# Patient Record
Sex: FEMALE | Race: WHITE | NOT HISPANIC OR LATINO | Employment: OTHER | URBAN - METROPOLITAN AREA
[De-identification: names, ages, dates, MRNs, and addresses within clinical notes are randomized per-mention and may not be internally consistent; named-entity substitution may affect disease eponyms.]

---

## 2019-02-17 ENCOUNTER — OFFICE VISIT (OUTPATIENT)
Dept: URGENT CARE | Facility: CLINIC | Age: 64
End: 2019-02-17
Payer: COMMERCIAL

## 2019-02-17 VITALS
OXYGEN SATURATION: 96 % | RESPIRATION RATE: 14 BRPM | SYSTOLIC BLOOD PRESSURE: 140 MMHG | DIASTOLIC BLOOD PRESSURE: 90 MMHG | HEART RATE: 62 BPM | TEMPERATURE: 98 F

## 2019-02-17 DIAGNOSIS — J01.00 ACUTE NON-RECURRENT MAXILLARY SINUSITIS: ICD-10-CM

## 2019-02-17 DIAGNOSIS — R05.9 COUGH: Primary | ICD-10-CM

## 2019-02-17 LAB
CTP QC/QA: YES
FLUAV AG NPH QL: NEGATIVE
FLUBV AG NPH QL: NEGATIVE

## 2019-02-17 PROCEDURE — 87804 POCT INFLUENZA A/B: ICD-10-PCS | Mod: QW,S$GLB,, | Performed by: FAMILY MEDICINE

## 2019-02-17 PROCEDURE — 3077F SYST BP >= 140 MM HG: CPT | Mod: CPTII,S$GLB,, | Performed by: FAMILY MEDICINE

## 2019-02-17 PROCEDURE — 99214 PR OFFICE/OUTPT VISIT, EST, LEVL IV, 30-39 MIN: ICD-10-PCS | Mod: 25,S$GLB,, | Performed by: FAMILY MEDICINE

## 2019-02-17 PROCEDURE — 96372 PR INJECTION,THERAP/PROPH/DIAG2ST, IM OR SUBCUT: ICD-10-PCS | Mod: S$GLB,,, | Performed by: FAMILY MEDICINE

## 2019-02-17 PROCEDURE — 96372 THER/PROPH/DIAG INJ SC/IM: CPT | Mod: S$GLB,,, | Performed by: FAMILY MEDICINE

## 2019-02-17 PROCEDURE — 3080F DIAST BP >= 90 MM HG: CPT | Mod: CPTII,S$GLB,, | Performed by: FAMILY MEDICINE

## 2019-02-17 PROCEDURE — 87804 INFLUENZA ASSAY W/OPTIC: CPT | Mod: QW,S$GLB,, | Performed by: FAMILY MEDICINE

## 2019-02-17 PROCEDURE — 99214 OFFICE O/P EST MOD 30 MIN: CPT | Mod: 25,S$GLB,, | Performed by: FAMILY MEDICINE

## 2019-02-17 PROCEDURE — 3077F PR MOST RECENT SYSTOLIC BLOOD PRESSURE >= 140 MM HG: ICD-10-PCS | Mod: CPTII,S$GLB,, | Performed by: FAMILY MEDICINE

## 2019-02-17 PROCEDURE — 3080F PR MOST RECENT DIASTOLIC BLOOD PRESSURE >= 90 MM HG: ICD-10-PCS | Mod: CPTII,S$GLB,, | Performed by: FAMILY MEDICINE

## 2019-02-17 RX ORDER — BETAMETHASONE SODIUM PHOSPHATE AND BETAMETHASONE ACETATE 3; 3 MG/ML; MG/ML
6 INJECTION, SUSPENSION INTRA-ARTICULAR; INTRALESIONAL; INTRAMUSCULAR; SOFT TISSUE
Status: COMPLETED | OUTPATIENT
Start: 2019-02-17 | End: 2019-02-17

## 2019-02-17 RX ORDER — AMOXICILLIN 875 MG/1
875 TABLET, FILM COATED ORAL 2 TIMES DAILY
Qty: 20 TABLET | Refills: 0 | Status: SHIPPED | OUTPATIENT
Start: 2019-02-17 | End: 2019-02-27

## 2019-02-17 RX ORDER — CODEINE PHOSPHATE AND GUAIFENESIN 10; 100 MG/5ML; MG/5ML
10 SOLUTION ORAL EVERY 6 HOURS PRN
Qty: 118 ML | Refills: 0 | Status: SHIPPED | OUTPATIENT
Start: 2019-02-17 | End: 2020-03-10

## 2019-02-17 RX ADMIN — BETAMETHASONE SODIUM PHOSPHATE AND BETAMETHASONE ACETATE 6 MG: 3; 3 INJECTION, SUSPENSION INTRA-ARTICULAR; INTRALESIONAL; INTRAMUSCULAR; SOFT TISSUE at 01:02

## 2019-02-17 NOTE — PROGRESS NOTES
Subjective:       Patient ID: Leila Stafford is a 63 y.o. female.    Vitals:  oral temperature is 97.7 °F (36.5 °C). Her blood pressure is 140/90 (abnormal) and her pulse is 62. Her respiration is 14 and oxygen saturation is 96%.     Chief Complaint: Cough    Pt c/o productive cough, started last night, nasal congestion, post nasal drip, headaches, bilateral ear pain, sore throat, taking Advil no relief       Cough   This is a new problem. The current episode started yesterday. The problem has been unchanged. The cough is productive of sputum. Associated symptoms include ear pain, headaches, nasal congestion, postnasal drip and a sore throat. Pertinent negatives include no chills, eye redness, fever, hemoptysis, myalgias, rash, shortness of breath or wheezing. Treatments tried: advil. The treatment provided no relief.       Constitution: Positive for fatigue. Negative for chills, sweating and fever.   HENT: Positive for ear pain, congestion, postnasal drip and sore throat. Negative for sinus pain, sinus pressure and voice change.    Neck: Negative for painful lymph nodes.   Eyes: Negative for eye redness.   Respiratory: Positive for cough and sputum production. Negative for chest tightness, bloody sputum, COPD, shortness of breath, stridor, wheezing and asthma.    Gastrointestinal: Negative for nausea and vomiting.   Musculoskeletal: Negative for muscle ache.   Skin: Negative for rash.   Allergic/Immunologic: Negative for seasonal allergies and asthma.   Neurological: Positive for headaches.   Hematologic/Lymphatic: Negative for swollen lymph nodes.       Objective:      Physical Exam   Constitutional: She is oriented to person, place, and time. She appears well-developed and well-nourished. She is cooperative.  Non-toxic appearance. She does not appear ill. No distress.   HENT:   Head: Normocephalic and atraumatic.   Right Ear: Hearing, tympanic membrane, external ear and ear canal normal.   Left Ear: Hearing,  tympanic membrane, external ear and ear canal normal.   Nose: Nose normal. No mucosal edema, rhinorrhea or nasal deformity. No epistaxis. Right sinus exhibits no maxillary sinus tenderness and no frontal sinus tenderness. Left sinus exhibits no maxillary sinus tenderness and no frontal sinus tenderness.   Mouth/Throat: Uvula is midline, oropharynx is clear and moist and mucous membranes are normal. No trismus in the jaw. Normal dentition. No uvula swelling. No posterior oropharyngeal erythema.   Eyes: Conjunctivae and lids are normal. No scleral icterus.   Sclera clear bilat   Neck: Trachea normal, full passive range of motion without pain and phonation normal. Neck supple.   Cardiovascular: Normal rate, regular rhythm, normal heart sounds, intact distal pulses and normal pulses.   Pulmonary/Chest: Effort normal and breath sounds normal. No respiratory distress.   Abdominal: Soft. Normal appearance and bowel sounds are normal. She exhibits no distension. There is no tenderness.   Musculoskeletal: Normal range of motion. She exhibits no edema or deformity.   Neurological: She is alert and oriented to person, place, and time. She exhibits normal muscle tone. Coordination normal.   Skin: Skin is warm, dry and intact. She is not diaphoretic. No pallor.   Psychiatric: She has a normal mood and affect. Her speech is normal and behavior is normal. Judgment and thought content normal. Cognition and memory are normal.   Nursing note and vitals reviewed.      Assessment:       1. Cough    2. Acute non-recurrent maxillary sinusitis        Plan:         Cough  -     POCT Influenza A/B    Acute non-recurrent maxillary sinusitis    Other orders  -     betamethasone acetate-betamethasone sodium phosphate injection 6 mg  -     amoxicillin (AMOXIL) 875 MG tablet; Take 1 tablet (875 mg total) by mouth 2 (two) times daily. for 10 days  Dispense: 20 tablet; Refill: 0  -     guaifenesin-codeine 100-10 mg/5 ml (TUSSI-ORGANIDIN NR)   mg/5 mL syrup; Take 10 mLs by mouth every 6 (six) hours as needed for Cough.  Dispense: 118 mL; Refill: 0     Patient's  has bacterial sinusitis.  Her symptoms of or of more recent onset for recommended she start with the cough syrup steroid injection if symptoms improve she probably does not need the Amoxil  .  Influenza titers negative.

## 2019-02-20 ENCOUNTER — TELEPHONE (OUTPATIENT)
Dept: URGENT CARE | Facility: CLINIC | Age: 64
End: 2019-02-20

## 2020-05-25 ENCOUNTER — CLINICAL SUPPORT (OUTPATIENT)
Dept: URGENT CARE | Facility: CLINIC | Age: 65
End: 2020-05-25
Payer: COMMERCIAL

## 2020-05-25 VITALS
HEART RATE: 64 BPM | WEIGHT: 150 LBS | OXYGEN SATURATION: 96 % | TEMPERATURE: 99 F | BODY MASS INDEX: 24.99 KG/M2 | RESPIRATION RATE: 16 BRPM | HEIGHT: 65 IN

## 2020-05-25 DIAGNOSIS — Z01.818 PRE-OP EXAM: ICD-10-CM

## 2020-05-25 PROCEDURE — U0003 INFECTIOUS AGENT DETECTION BY NUCLEIC ACID (DNA OR RNA); SEVERE ACUTE RESPIRATORY SYNDROME CORONAVIRUS 2 (SARS-COV-2) (CORONAVIRUS DISEASE [COVID-19]), AMPLIFIED PROBE TECHNIQUE, MAKING USE OF HIGH THROUGHPUT TECHNOLOGIES AS DESCRIBED BY CMS-2020-01-R: HCPCS

## 2020-05-26 LAB — SARS-COV-2 RNA RESP QL NAA+PROBE: NOT DETECTED

## 2020-05-28 PROBLEM — N63.21 MASS OF UPPER OUTER QUADRANT OF LEFT BREAST: Status: ACTIVE | Noted: 2020-05-28

## 2020-07-08 DIAGNOSIS — N63.21 MASS OF UPPER OUTER QUADRANT OF LEFT BREAST: Primary | ICD-10-CM

## 2020-07-09 ENCOUNTER — OFFICE VISIT (OUTPATIENT)
Dept: HEMATOLOGY/ONCOLOGY | Facility: CLINIC | Age: 65
End: 2020-07-09
Payer: MEDICARE

## 2020-07-09 VITALS
DIASTOLIC BLOOD PRESSURE: 76 MMHG | HEIGHT: 65 IN | WEIGHT: 152.75 LBS | HEART RATE: 69 BPM | SYSTOLIC BLOOD PRESSURE: 142 MMHG | RESPIRATION RATE: 10 BRPM | BODY MASS INDEX: 25.45 KG/M2 | TEMPERATURE: 96 F | OXYGEN SATURATION: 98 %

## 2020-07-09 DIAGNOSIS — M54.50 CHRONIC MIDLINE LOW BACK PAIN WITHOUT SCIATICA: ICD-10-CM

## 2020-07-09 DIAGNOSIS — G89.29 CHRONIC MIDLINE LOW BACK PAIN WITHOUT SCIATICA: ICD-10-CM

## 2020-07-09 DIAGNOSIS — R45.89 ANXIETY ABOUT HEALTH: ICD-10-CM

## 2020-07-09 DIAGNOSIS — C50.912 INVASIVE DUCTAL CARCINOMA OF LEFT BREAST: Primary | ICD-10-CM

## 2020-07-09 PROCEDURE — 99999 PR PBB SHADOW E&M-EST. PATIENT-LVL IV: CPT | Mod: PBBFAC,,, | Performed by: INTERNAL MEDICINE

## 2020-07-09 PROCEDURE — 99999 PR PBB SHADOW E&M-EST. PATIENT-LVL IV: ICD-10-PCS | Mod: PBBFAC,,, | Performed by: INTERNAL MEDICINE

## 2020-07-09 PROCEDURE — 99205 PR OFFICE/OUTPT VISIT, NEW, LEVL V, 60-74 MIN: ICD-10-PCS | Mod: S$PBB,,, | Performed by: INTERNAL MEDICINE

## 2020-07-09 PROCEDURE — 99205 OFFICE O/P NEW HI 60 MIN: CPT | Mod: S$PBB,,, | Performed by: INTERNAL MEDICINE

## 2020-07-09 PROCEDURE — 99214 OFFICE O/P EST MOD 30 MIN: CPT | Mod: PBBFAC,PN | Performed by: INTERNAL MEDICINE

## 2020-07-09 NOTE — Clinical Note
Nisreen, I didn't have [facility] records in packet, see note, thanks.   Otehwrise I have contacted surgery to inform can schedule, please schedule MD vis here 3.5 weeks, thanks.

## 2020-07-09 NOTE — LETTER
July 9, 2020      Edilma Chong MD  [address] [facility] [address]           [facility] [address] [place] [address]  [facility] [address]  Phone: [phone]  Fax: [phone]          Patient: Leila Stafford   MR Number: [id number]   YOB: 1955   Date of Visit: 7/9/2020       Dear Dr. Edilma Chong:    Thank you for referring Leila Stafford to me for evaluation. Attached you will find relevant portions of my assessment and plan of care.    If you have questions, please do not hesitate to call me. I look forward to following Leila Stafford along with you.    Sincerely,    Bisi Raza MD    Enclosure  CC:  No Recipients    If you would like to receive this communication electronically, please contact [email or web address] or [phone] to request more information on [facility] access.    For providers and/or their staff who would like to refer a patient to [facility], please contact us through our one-stop-shop provider referral line, [facility], at [phone].    If you feel you have received this communication in error or would no longer like to receive these types of communications, please e-mail [email or web address]

## 2020-07-09 NOTE — PROGRESS NOTES
INITIAL CONSULTATION     DATE: 07/09/2020  Patient Name: Leila Stafford  Referred by: Edilma Chong,*  Reason for referral:  Breast cancer      Subjective:       Patient ID: Leila Stafford is a 65 y.o. female.    Chief Complaint: Establish Care    HPI    Medical oncology history for review prior to visit:  02/20/2019-mammogram screening with rosalio done at [facility]-bilateral breast implants are normal and symmetric in shape and position.  There has been interval development of a 3 mm density in the inner posterior depth of the right breast with obscured margins, recommend ultrasound.  02/20/2019-mammogram diagnostic bilateral rosalio done at [facility]-impression is post biopsy changes in the anterior left breast continued yearly follow-up is recommended unless clinically necessary otherwise  02/26/2019-ultrasound breast complete right-simple cysts corresponds to the mammographic abnormality resumption of routine mammographic surveillance recommended.  [facility] is location mammogram  02/13/2020-left breast mass at 12:00 p.m.- core biopsy   Pathology:  papilloma with atypia  05/26/2020-mammogram left with rosalio and ultrasound-   Palpable abnormality at the left 1:00 a.m. corresponds to an irregular solid mass with cystic components measuring 29 x 11 x 18 mm extending towards the nipple.  No abnormal lymph nodes are seen in the axilla.  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   Left breast re-excision superior margin:  No tumor seen.   Right breast capsule excision-no tumor seen   Left breast capsule excision-no tumor seen   Receptors:  ER 98.2% positive, OH 1% positive, HER2 negative, Ki 67 is 38.3%   PT1b pNx    06/14/2020-[facility] genetic testing-negative, 47 genes tested  06/30/2020-Oncotype recurrence score-30  Distant recurrence risk at 9 years with tamoxifen alone is 19%  Group average absolute chemotherapy  benefit of recurrence score  is greater than 15%    7/9/20 - initial consult:  Patient confirms above history and reports overall good health, with the following:  Patient reports - kidney function right smaller than left,   Fell off ladder - 2012 - fractured back - no surgery - chronic pain, daily, takes tramadol and this helps. She gets injections. Doesn't inhjibit     Daily routine: sews and walks dog, 30 minute walk in afternoon. No other exercise.   Energy is good. Stamina okay.     No smoking or dirnking.     Her son had pancreatic and duodenum ca 2019 diagnosed.   He got 5FU chemo, very ill, 1/2020, [facility].   He was then directed try keytruda. His primary is squamous not adeno and he has had good success with keytruda, tumor shrunk from 10cm to 4cm.     They are anxious about her kidney issues and what they've read on interent about the oncotype.     Family history: mother with breast ca at 79yo, lived to be 95yo. Son - 37yo with duodenal ca as above. His genetic testing also neg.   lives with  who she has been with since high school. 3 sons and 6 grandkids.   She has a History of athletics, gymnast and racquet ball prior to back surgery.     Past Medical History:   Diagnosis Date    Chronic back pain     Chronic kidney disease (CKD), stage III (moderate)     Dysphonia     gets botox injection in vocal cords    Hypercalcemia     Hyperlipidemia     Hypertension     Magnesium deficiency     Secondary hyperparathyroidism     Vitamin D deficiency      Past Surgical History:   Procedure Laterality Date    AUGMENTATION OF BREAST Bilateral     BREAST BIOPSY Left 02/2020    Papilloma with atypia    BREAST MASS EXCISION Left 5/28/2020    Procedure: EXCISION, MASS, BREAST;  Surgeon: Edilma Chong MD;  Location: [facility];  Service: General;  Laterality: Left;    BREAST RECONSTRUCTION Bilateral 5/28/2020    Procedure: RECONSTRUCTION, BREAST;   Surgeon: Gian Carrera MD;  Location: [facility];  Service: Plastics;  Laterality: Bilateral;    BREAST SURGERY      INSERTION OF BREAST IMPLANT Bilateral 5/28/2020    Procedure: INSERTION, BREAST IMPLANT;  Surgeon: Gian Carrera MD;  Location: [facility];  Service: Plastics;  Laterality: Bilateral;    REMOVAL OF BREAST IMPLANT Bilateral 5/28/2020    Procedure: REMOVAL, IMPLANT, BREAST;  Surgeon: Edilma Chong MD;  Location: [facility];  Service: General;  Laterality: Bilateral;  left and right breast capsule removed and sent to pathology  left taken out was ruptured right implant intact     Social History     Socioeconomic History    Marital status:      Spouse name: Not on file    Number of children: Not on file    Years of education: Not on file    Highest education level: Not on file   Occupational History    Occupation: RETIRED     Employer: [employer]   Social Needs    Financial resource strain: Not on file    Food insecurity     Worry: Not on file     Inability: Not on file    Transportation needs     Medical: Not on file     Non-medical: Not on file   Tobacco Use    Smoking status: Never Smoker    Smokeless tobacco: Never Used   Substance and Sexual Activity    Alcohol use: Yes     Comment: occasional    Drug use: No    Sexual activity: Yes     Partners: Male   Lifestyle    Physical activity     Days per week: Not on file     Minutes per session: Not on file    Stress: Not on file   Relationships    Social connections     Talks on phone: Not on file     Gets together: Not on file     Attends Yazidi service: Not on file     Active member of club or organization: Not on file     Attends meetings of clubs or organizations: Not on file     Relationship status: Not on file   Other Topics Concern    Not on file   Social History Narrative    Not on file     Family History   Problem Relation Age of Onset    Hypertension Mother     Diabetes Father      "Heart disease Father     Hypertension Father     Heart failure Father        Current Outpatient Medications   Medication Sig Dispense Refill    ALPRAZolam (XANAX) 1 MG tablet Take 1 mg by mouth daily as needed.   0    ergocalciferol (ERGOCALCIFEROL) 50,000 unit Cap Take 1 capsule (50,000 Units total) by mouth every 30 days. Take 1 capsule by mouth every 10 days on the 1st, 10th, and 20th of each month 3 capsule 3    gabapentin (NEURONTIN) 300 MG capsule Take 300 mg by mouth once daily.       magnesium oxide (MAG-OX) 400 mg (241.3 mg magnesium) tablet Take 400 mg by mouth once daily.      metoprolol tartrate (LOPRESSOR) 100 MG tablet Take by mouth. 100 MG QAM 50 MG QPM      paricalcitoL (ZEMPLAR) 1 MCG capsule TAKE 1 CAPSULE (1 MCG TOTAL) BY MOUTH ONCE DAILY. (Patient taking differently: Take 1 mcg by mouth 3 (three) times a week. ) 90 capsule 3    pravastatin (PRAVACHOL) 20 MG tablet Take 20 mg by mouth once daily.       tramadol (ULTRAM-ER) 300 MG Tb24 Take 300 mg by mouth once daily.        No current facility-administered medications for this visit.      ALLERGIES REVIEWED    Review of Systems    Constitutional: Negative for activity change, appetite change, negative fatigue, fever and unexpected weight change.   HENT: Negative for mouth sores and sore throat.    Respiratory: Negative for cough and shortness of breath.    Cardiovascular: Negative for chest pain, palpitations and leg swelling.   Gastrointestinal: Negative for abdominal pain, constipation and diarrhea.   Genitourinary: Negative for dysuria and frequency.   Musculoskeletal: Negative for back pain and joint swelling.   Neurological: Negative for weakness, light-headedness and numbness.   Hematological: Does not bruise/bleed easily.   Skin: no rash, no lesions, no itching    Objective:      BP (!) 142/76 (BP Location: Left arm, Patient Position: Sitting)   Pulse 69   Temp 96.3 °F (35.7 °C) (Temporal)   Resp 10   Ht 5' 5" (1.651 m)   Wt " 69.3 kg (152 lb 12.5 oz)   SpO2 98%   BMI 25.42 kg/m²    Wt Readings from Last 25 Encounters:   07/09/20 69.3 kg (152 lb 12.5 oz)   05/28/20 67.6 kg (149 lb 0.5 oz)   05/25/20 68 kg (150 lb)   03/10/20 68 kg (150 lb)   08/12/19 68 kg (150 lb)   04/16/19 69.4 kg (153 lb)   01/16/13 64.4 kg (142 lb)   10/29/12 62.2 kg (137 lb 3.2 oz)       Physical Exam    Constitutional: She is oriented to person, place, and time. No distress.   HENT:   Mouth/Throat: Oropharynx is clear and moist. No oropharyngeal exudate.   Eyes: Conjunctivae and EOM are normal.   Neck: Normal range of motion. Neck supple.   Cardiovascular: Normal rate, regular rhythm, normal heart sounds and intact distal pulses.    Breast: bilateral implants, no masses on palpation, well healing incision.   Pulmonary/Chest: Effort normal and breath sounds normal. She has no wheezes. She has no rales.   Abdominal: Soft. Bowel sounds are normal. She exhibits no distension. There is no tenderness.   Musculoskeletal: She exhibits no edema or tenderness.   Lymphadenopathy:   She has no cervical adenopathy. No axillary, no supraclav LAD.   Neurological: She is alert and oriented to person, place, and time. normal strength. No cranial nerve deficit or sensory deficit.   Skin: Skin is warm and dry. No rash noted. No erythema.   Psychiatric: She has a normal mood and affect. Her speech is normal.   Nursing note and vitals reviewed.    No results found for this or any previous visit (from the past 72 hour(s)).    Assessment:       1. Invasive ductal carcinoma of left breast    2. Anxiety about health    3. Chronic midline low back pain without sciatica      Stage I  ECOG SCORE         ECOG 0    Patient is a 65 y.o. year old female with new diagnosis left breast cancer per core biopsy on 2/2020 showing papilloma and excisional biopsy on 5/2020 showing invasive papillary carcinoma 9mm in size and oncotype was sent with score of 30. She will be having mastectomy and SLND to  be scheduled.     Discussed diagnosis, staging, receptor status, various treatments including surgery, chemotherapy, radiation, endocrine therapy. Discussed recommended treatment plan and benefits/risks, rationale for recommendations based on NCCN guidelines. Patient and family expressed understanding and questions answered to their satisfaction.    For this patient, we discussed in detail her pathology and oncotype score and rationale for recommendations per guidelines and clinical trials to consider chemotherapy and reviewed benefit/risk of all options (chemo vs endocrine/chemo vs scenario change if LN+ at time of surgery. Hand written info and direction given to patient, expressed understanding.    Discussed coping with cancer diagnosis and support given. Especially with history of son's malignancy, understandable she is emotional and undecided about chemotherapy or not. We did review recommended chemo plan (TC) in detail if she decides to move forward with this.     Discussed exercise, nutrition.     Discussed consider zoledronic acid adjuvant for bone health and per guidelines dec risk of breast ca recurrence  Discussed benefits of exercise and healthy body weight (BMI<25) in decreasing risk of breast cancer recurrence and advised exercise and diet, she expressed understanding.           Plan:       Leila PENDLETON was seen today for establish care.    Diagnoses and all orders for this visit:    Invasive ductal carcinoma of left breast  -     Ambulatory referral/consult to Hematology/Oncology/Psychology    Anxiety about health    Chronic midline low back pain without sciatica                Request MMG/US from 2/2020 that I do not have and Dr. Chong clinic notes.    She will proceed with scheduling mastectomy and SLND    MD vis here in 3.5 weeks - will reschedule this according to surgery schedule if needed.     Keep follow-ups with PCP, Gyn, surgery    Exercise/nutrition      Discussed plan above in great detail  with patient and  and all questions answered to their satisfaction. Proceed with plan above.       Thank you for the referral. Please call with any questions.           Bisi Raza M.D.  Hematology Oncology  [facility]

## 2020-07-10 ENCOUNTER — LAB VISIT (OUTPATIENT)
Dept: URGENT CARE | Facility: CLINIC | Age: 65
End: 2020-07-10
Payer: COMMERCIAL

## 2020-07-10 DIAGNOSIS — Z01.818 PREOP TESTING: ICD-10-CM

## 2020-07-10 PROCEDURE — U0003 INFECTIOUS AGENT DETECTION BY NUCLEIC ACID (DNA OR RNA); SEVERE ACUTE RESPIRATORY SYNDROME CORONAVIRUS 2 (SARS-COV-2) (CORONAVIRUS DISEASE [COVID-19]), AMPLIFIED PROBE TECHNIQUE, MAKING USE OF HIGH THROUGHPUT TECHNOLOGIES AS DESCRIBED BY CMS-2020-01-R: HCPCS

## 2020-07-11 LAB — SARS-COV-2 RNA RESP QL NAA+PROBE: NOT DETECTED

## 2020-07-16 ENCOUNTER — LAB VISIT (OUTPATIENT)
Dept: URGENT CARE | Facility: CLINIC | Age: 65
End: 2020-07-16
Payer: MEDICARE

## 2020-07-16 DIAGNOSIS — Z01.818 PRE-OP EXAM: ICD-10-CM

## 2020-07-16 PROCEDURE — U0003 INFECTIOUS AGENT DETECTION BY NUCLEIC ACID (DNA OR RNA); SEVERE ACUTE RESPIRATORY SYNDROME CORONAVIRUS 2 (SARS-COV-2) (CORONAVIRUS DISEASE [COVID-19]), AMPLIFIED PROBE TECHNIQUE, MAKING USE OF HIGH THROUGHPUT TECHNOLOGIES AS DESCRIBED BY CMS-2020-01-R: HCPCS

## 2020-07-17 LAB — SARS-COV-2 RNA RESP QL NAA+PROBE: NOT DETECTED

## 2020-07-20 ENCOUNTER — CLINICAL SUPPORT (OUTPATIENT)
Dept: URGENT CARE | Facility: CLINIC | Age: 65
End: 2020-07-20
Payer: COMMERCIAL

## 2020-07-20 DIAGNOSIS — Z01.818 PREOP TESTING: ICD-10-CM

## 2020-07-20 PROCEDURE — U0003 INFECTIOUS AGENT DETECTION BY NUCLEIC ACID (DNA OR RNA); SEVERE ACUTE RESPIRATORY SYNDROME CORONAVIRUS 2 (SARS-COV-2) (CORONAVIRUS DISEASE [COVID-19]), AMPLIFIED PROBE TECHNIQUE, MAKING USE OF HIGH THROUGHPUT TECHNOLOGIES AS DESCRIBED BY CMS-2020-01-R: HCPCS

## 2020-07-21 LAB — SARS-COV-2 RNA RESP QL NAA+PROBE: NOT DETECTED

## 2020-07-22 PROBLEM — Z17.0 MALIGNANT NEOPLASM OF UPPER-OUTER QUADRANT OF LEFT BREAST IN FEMALE, ESTROGEN RECEPTOR POSITIVE: Status: ACTIVE | Noted: 2020-07-22

## 2020-07-22 PROBLEM — C50.412 MALIGNANT NEOPLASM OF UPPER-OUTER QUADRANT OF LEFT BREAST IN FEMALE, ESTROGEN RECEPTOR POSITIVE: Status: ACTIVE | Noted: 2020-07-22

## 2020-08-19 ENCOUNTER — OFFICE VISIT (OUTPATIENT)
Dept: HEMATOLOGY/ONCOLOGY | Facility: CLINIC | Age: 65
End: 2020-08-19
Payer: MEDICARE

## 2020-08-19 VITALS
HEIGHT: 65 IN | DIASTOLIC BLOOD PRESSURE: 73 MMHG | TEMPERATURE: 98 F | HEART RATE: 63 BPM | SYSTOLIC BLOOD PRESSURE: 148 MMHG | RESPIRATION RATE: 18 BRPM | OXYGEN SATURATION: 99 % | WEIGHT: 151.69 LBS | BODY MASS INDEX: 25.27 KG/M2

## 2020-08-19 DIAGNOSIS — Z90.13 S/P BILATERAL MASTECTOMY: ICD-10-CM

## 2020-08-19 DIAGNOSIS — G89.29 CHRONIC MIDLINE LOW BACK PAIN WITHOUT SCIATICA: ICD-10-CM

## 2020-08-19 DIAGNOSIS — C50.912 INVASIVE DUCTAL CARCINOMA OF LEFT BREAST: Primary | ICD-10-CM

## 2020-08-19 DIAGNOSIS — R45.89 ANXIETY ABOUT HEALTH: ICD-10-CM

## 2020-08-19 DIAGNOSIS — M54.50 CHRONIC MIDLINE LOW BACK PAIN WITHOUT SCIATICA: ICD-10-CM

## 2020-08-19 PROCEDURE — 99999 PR PBB SHADOW E&M-EST. PATIENT-LVL III: CPT | Mod: PBBFAC,,, | Performed by: INTERNAL MEDICINE

## 2020-08-19 PROCEDURE — 99215 OFFICE O/P EST HI 40 MIN: CPT | Mod: S$PBB,,, | Performed by: INTERNAL MEDICINE

## 2020-08-19 PROCEDURE — 99215 PR OFFICE/OUTPT VISIT, EST, LEVL V, 40-54 MIN: ICD-10-PCS | Mod: S$PBB,,, | Performed by: INTERNAL MEDICINE

## 2020-08-19 PROCEDURE — 99213 OFFICE O/P EST LOW 20 MIN: CPT | Mod: PBBFAC,PN | Performed by: INTERNAL MEDICINE

## 2020-08-19 PROCEDURE — 99999 PR PBB SHADOW E&M-EST. PATIENT-LVL III: ICD-10-PCS | Mod: PBBFAC,,, | Performed by: INTERNAL MEDICINE

## 2020-08-19 NOTE — PROGRESS NOTES
FOLLOW-UP VISIT    Patient name: Leila Stafford  Date: 8/19/20      Subjective:       Patient ID: Leila Stafford is a 65 y.o. female.    Chief Complaint: Breast Cancer    HPI  Here to follow-up after surgery and re-visit chemo discussion. oncotype 30. She has thought more about it and remains undecided. Long discussion on this today.    See detailed oncology history below, updated with most recent scans, labs, pertinent medical history.   Oncology History   Malignant neoplasm of upper-outer quadrant of left breast in female, estrogen receptor positive   7/22/2020 Initial Diagnosis    Malignant neoplasm of upper-outer quadrant of left breast in female, estrogen receptor positive    Medical oncology history for review prior to visit:  02/20/2019-mammogram screening with rosalio done at [place]-bilateral breast implants are normal and symmetric in shape and position.  There has been interval development of a 3 mm density in the inner posterior depth of the right breast with obscured margins, recommend ultrasound.  02/20/2019-mammogram diagnostic bilateral rosalio done at [facility]-impression is post biopsy changes in the anterior left breast continued yearly follow-up is recommended unless clinically necessary otherwise  02/26/2019-ultrasound breast complete right-simple cysts corresponds to the mammographic abnormality resumption of routine mammographic surveillance recommended.  [facility] is location mammogram  02/13/2020-left breast mass at 12:00 p.m.- core biopsy              Pathology:  papilloma with atypia  05/26/2020-mammogram left with rosalio and ultrasound-              Palpable abnormality at the left 1:00 a.m. corresponds to an irregular solid mass with cystic components measuring 29 x 11 x 18 mm extending towards the nipple.  No abnormal lymph nodes are seen in the axilla.  05/28/2020-left breast wide excision core needle biopsy site-              Pathology:  Invasive papillary carcinoma 0.9 cm, extending to  superior edge.              Left breast re-excision superior margin:  No tumor seen.              Right breast capsule excision-no tumor seen              Left breast capsule excision-no tumor seen              Receptors:  ER 98.2% positive, SC 1% positive, HER2 negative, Ki 67 is 38.3%              PT1b pNx     06/14/2020-[employer] genetic testing-negative, 47 genes tested  06/30/2020-Oncotype recurrence score-30  Distant recurrence risk at 9 years with tamoxifen alone is 19%  Group average absolute chemotherapy benefit of recurrence score  is greater than 15%     7/9/20 - initial consult:  Patient confirms above history and reports overall good health.  Son going through chemotherapy for metastatic cancer spring 2020, was on hospice at one point, very stressful for pt and  and family.    DIAGNOSIS:   07/27/2020   JL:sanam     1.  LEFT BREAST, NIPPLE SPARING MASTECTOMY:   - NO RESIDUAL INVASIVE CARCINOMA SEEN.   - RESIDUAL IN SITU DUCT CARCINOMA AROUND PRIOR SURGERY SITE, SOLID AND    CRIBRIFORM TYPE,    INTERMEDIATE NUCLEAR GRADE, FOCALLY AT ANTERIOR MARGIN.   - SURGICAL REPAIR REACTION.     2.  LEFT AXILLARY SENTINEL LYMPH NODES, EXCISION:   - TWO LYMPH NODES, BOTH NEGATIVE FOR TUMOR (0/2).     3.  LEFT RETROAREOLAR SUPERIOR MARGIN TISSUE, EXCISION:   - NO TUMOR SEEN.   - SURGICAL REPAIR REACTION.    8/18/20 - med onc follow-up - plan for endocrine therapy     9/2/2020 -  Chemotherapy    Treatment Summary   Plan Name: OP BREAST TC - DOCETAXEL CYCLOPHOSPHAMIDE Q3W  Treatment Goal: Curative  Status: Active  Start Date: 9/2/2020 (Planned)  End Date: 11/5/2020 (Planned)  Provider: Bisi Raza MD  Chemotherapy: CYCLOPHOSPHAMIDE INFUSION, 600 mg/m2, Intravenous, Clinic/HOD 1 time, 0 of 4 cycles  DOCETAXEL CHEMO INFUSION, 75 mg/m2, Intravenous, Clinic/HOD 1 time, 0 of 4 cycles         I have reviewed my initial consult note with detailed PMH/ROS from initial encounter and all following progress notes.  "  Past medical, surgical, family, and social history were reviewed today and there are no changes of note unless mentioned in HPI.     MEDS and ALLERGIES were reviewed with patient and meds reconciled.     Fatigue  none    Weight/appetite  Good    Constitutional  Denies F/C    Pain  Denies    Sleep  Ok    Mucositis  Denies    Cardiovascular  Denies CP    Respiratory  Denies SOB    Nausea  Denies    BMs  Normal    GI  Denies abdominal pain    Bleeding  Denies epistaxis, hemoptysis, BRBPR, melena, hematuria or any other bleeding    Extremities  Denies edema    Skin/nail/hair  Denies toxicity    Neuropathy  Denies    Psych  In good spirits    Misc  n/a    Exercise  yes          Objective:      BP (!) 148/73   Pulse 63   Temp 97.8 °F (36.6 °C) (Oral)   Resp 18   Ht 5' 5" (1.651 m)   Wt 68.8 kg (151 lb 10.8 oz)   SpO2 99%   BMI 25.24 kg/m²   Wt Readings from Last 30 Encounters:   08/19/20 68.8 kg (151 lb 10.8 oz)   07/23/20 68.9 kg (152 lb)   07/09/20 69.3 kg (152 lb 12.5 oz)   05/28/20 67.6 kg (149 lb 0.5 oz)   05/25/20 68 kg (150 lb)   03/10/20 68 kg (150 lb)   08/12/19 68 kg (150 lb)   04/16/19 69.4 kg (153 lb)   01/16/13 64.4 kg (142 lb)   10/29/12 62.2 kg (137 lb 3.2 oz)       Constitutional: Patient is oriented to person, place, and time. No distress.   HENT: Mouth/Throat: Oropharynx is clear and moist. No oropharyngeal exudate.   Eyes: Conjunctivae and EOM are normal.   Neck: Normal range of motion. Neck supple.   Cardiovascular: Normal rate, regular rhythm, normal heart sounds and intact distal pulses.    Pulmonary/Chest: Effort normal and breath sounds normal. no wheezes. no rales.    Breast:well healing bilateral matectomy incision sites  Abdominal: Soft. Bowel sounds are normal. Patient exhibits no distension. There is no tenderness.   Musculoskeletal: patient exhibits no edema or tenderness.   Lymphadenopathy:  patient  has no cervical adenopathy. no supraclavicular adenopathy. No axillary LAD. "   Neurological: Patient is alert and oriented to person, place, and time. normal strength. No cranial nerve deficit or sensory deficit.   Skin: Skin is warm and dry. No rash noted. No erythema.   Psychiatric: Patient has a normal mood and affect. speech is normal.   Nursing note and vitals reviewed.      Assessment:       1. Invasive ductal carcinoma of left breast    2. Anxiety about health    3. Chronic midline low back pain without sciatica    4. S/P bilateral mastectomy    Stage I  ECOG SCORE           ECOG 0     Patient is a 65 y.o. year old female with new diagnosis left breast cancer per core biopsy on 2/2020 showing papilloma and excisional biopsy on 5/2020 showing invasive papillary carcinoma 9mm in size and oncotype was sent with score of 30. She will be having mastectomy and SLND to be scheduled.      Discussed diagnosis, staging, receptor status, various treatments including surgery, chemotherapy, radiation, endocrine therapy. Discussed recommended treatment plan and benefits/risks, rationale for recommendations based on NCCN guidelines. Patient and family expressed understanding and questions answered to their satisfaction.     For this patient, we discussed in detail her pathology and oncotype score and rationale for recommendations per guidelines and clinical trials to consider chemotherapy and reviewed benefit/risk of all options (chemo vs endocrine/chemo vs scenario change if LN+ at time of surgery. Hand written info and direction given to patient, expressed understanding.     Discussed coping with cancer diagnosis and support given. Especially with history of son's malignancy, understandable she is emotional and undecided about chemotherapy or not. We did review recommended chemo plan (TC) in detail if she decides to move forward with this.      Discussed exercise, nutrition.      Discussed consider zoledronic acid adjuvant for bone health and per guidelines dec risk of breast ca  recurrence  Discussed benefits of exercise and healthy body weight (BMI<25) in decreasing risk of breast cancer recurrence and advised exercise and diet, she expressed understanding.     TODAY 8/19/20 - long discussion post-op (pathology good, no residual cancer or upstaging) about proceeding with adjuvant chemo vs endocrine, pros cons, oncotype, clinical trials supporting chemo decision and NCCN guidelines, risk of recurrence.     After extensive discussion, she would like to move forward with chemo.   Discussed mgmt of symptoms/chemo side effects, nutrition, weight, activity, exercise, treatment plan in detail.          Plan:       Leila PENDLETON was seen today for breast cancer.    Diagnoses and all orders for this visit:    Invasive ductal carcinoma of left breast    Anxiety about health    Chronic midline low back pain without sciatica    S/P bilateral mastectomy            mediport dr lim    Chemo ed    Start chemo approx week of 8/31/20  MD vis on day of chemo C1D1 or the two days prior  Lab prior to chemo - cbc, cmp, mag, phos    Exercise/nutrition important, reviewed    Continue current medications, reviewed.      Important to keep follow-up with PCP and surgery.     Discussed plan above in great detail with patient and all questions answered to their satisfaction. Proceed with plan above.          Bisi Raza M.D.  Hematology Oncology  [facility]  [facility]

## 2020-08-19 NOTE — PLAN OF CARE
START ON PATHWAY REGIMEN - Breast    [id number]        Docetaxel (Taxotere)       Cyclophosphamide (Cytoxan)           Additional Orders: Premedicate with dexamethasone 8 mg PO bid for three   days beginning 1 day prior to therapy    **Always confirm dose/schedule in your pharmacy ordering system**    Patient Characteristics:  Postoperative without Neoadjuvant Therapy (Pathologic Staging), Invasive   Disease, Adjuvant Therapy, HER2 Negative/Unknown/Equivocal, ER Positive, Node   Negative, pT1a-c, pN0/N1mi or pT2 or Higher, pN0, Oncotype High Risk (? 26)  Therapeutic Status: Postoperative without Neoadjuvant Therapy (Pathologic   Staging)  AJCC Grade: G3  AJCC N Category: pN0  AJCC M Category: cM0  ER Status: Positive (+)  AJCC 8 Stage Grouping: IA  HER2 Status: Negative (-)  Oncotype Dx Recurrence Score: 30  AJCC T Category: pT1b  MD Status: Positive (+)  Has this patient completed genomic testing<= Yes - Oncotype DX(R)  Intent of Therapy:  Curative Intent, Discussed with Patient

## 2020-08-25 ENCOUNTER — OFFICE VISIT (OUTPATIENT)
Dept: HEMATOLOGY/ONCOLOGY | Facility: CLINIC | Age: 65
End: 2020-08-25
Payer: MEDICARE

## 2020-08-25 VITALS
HEART RATE: 60 BPM | RESPIRATION RATE: 20 BRPM | HEIGHT: 65 IN | DIASTOLIC BLOOD PRESSURE: 96 MMHG | WEIGHT: 143.94 LBS | BODY MASS INDEX: 23.98 KG/M2 | SYSTOLIC BLOOD PRESSURE: 156 MMHG | OXYGEN SATURATION: 96 % | TEMPERATURE: 97 F

## 2020-08-25 DIAGNOSIS — R45.89 ANXIETY ABOUT HEALTH: ICD-10-CM

## 2020-08-25 DIAGNOSIS — C50.912 INVASIVE DUCTAL CARCINOMA OF LEFT BREAST: Primary | ICD-10-CM

## 2020-08-25 PROCEDURE — 99215 PR OFFICE/OUTPT VISIT, EST, LEVL V, 40-54 MIN: ICD-10-PCS | Mod: S$PBB,,, | Performed by: INTERNAL MEDICINE

## 2020-08-25 PROCEDURE — 99999 PR PBB SHADOW E&M-EST. PATIENT-LVL III: ICD-10-PCS | Mod: PBBFAC,,, | Performed by: INTERNAL MEDICINE

## 2020-08-25 PROCEDURE — 99213 OFFICE O/P EST LOW 20 MIN: CPT | Mod: PBBFAC,PN | Performed by: INTERNAL MEDICINE

## 2020-08-25 PROCEDURE — 99999 PR PBB SHADOW E&M-EST. PATIENT-LVL III: CPT | Mod: PBBFAC,,, | Performed by: INTERNAL MEDICINE

## 2020-08-25 PROCEDURE — 99215 OFFICE O/P EST HI 40 MIN: CPT | Mod: S$PBB,,, | Performed by: INTERNAL MEDICINE

## 2020-08-25 RX ORDER — ANASTROZOLE 1 MG/1
1 TABLET ORAL DAILY
Qty: 30 TABLET | Refills: 2 | Status: SHIPPED | OUTPATIENT
Start: 2020-08-25 | End: 2020-11-24

## 2020-08-25 NOTE — PROGRESS NOTES
FOLLOW-UP VISIT    Patient name: Leila Stafford  Date: 8/25/20      Subjective:       Patient ID: Leila Stafford is a 65 y.o. female.    Chief Complaint: Follow-up    HPI  Here today with , tearful, to discuss chemotherapy - she decided last week to pursue this and feels doubtful about this today.     Very long discussion about pros cons chemo with oncotype score information, papillary pathology, risk of recurrence and possibly not curable if recurs. She is most concerned about risk/SE with chemotherapy, more so than recurrence.    See detailed oncology history below, updated with most recent scans, labs, pertinent medical history.   Oncology History   Malignant neoplasm of upper-outer quadrant of left breast in female, estrogen receptor positive   7/22/2020 Initial Diagnosis    Malignant neoplasm of upper-outer quadrant of left breast in female, estrogen receptor positive    Medical oncology history for review prior to visit:  02/20/2019-mammogram screening with rosalio done at [facility]-bilateral breast implants are normal and symmetric in shape and position.  There has been interval development of a 3 mm density in the inner posterior depth of the right breast with obscured margins, recommend ultrasound.  02/20/2019-mammogram diagnostic bilateral rosalio done at [place]-impression is post biopsy changes in the anterior left breast continued yearly follow-up is recommended unless clinically necessary otherwise  02/26/2019-ultrasound breast complete right-simple cysts corresponds to the mammographic abnormality resumption of routine mammographic surveillance recommended.  [facility] is location mammogram  02/13/2020-left breast mass at 12:00 p.m.- core biopsy              Pathology:  papilloma with atypia  05/26/2020-mammogram left with rosalio and ultrasound-              Palpable abnormality at the left 1:00 a.m. corresponds to an irregular solid mass with cystic components measuring 29 x 11 x 18 mm extending  towards the nipple.  No abnormal lymph nodes are seen in the axilla.  05/28/2020-left breast wide excision core needle biopsy site-              Pathology:  Invasive papillary carcinoma 0.9 cm, extending to superior edge.              Left breast re-excision superior margin:  No tumor seen.              Right breast capsule excision-no tumor seen              Left breast capsule excision-no tumor seen              Receptors:  ER 98.2% positive, CA 1% positive, HER2 negative, Ki 67 is 38.3%              PT1b pNx     06/14/2020-[employer] genetic testing-negative, 47 genes tested  06/30/2020-Oncotype recurrence score-30  Distant recurrence risk at 9 years with tamoxifen alone is 19%  Group average absolute chemotherapy benefit of recurrence score  is greater than 15%     7/9/20 - initial consult:  Patient confirms above history and reports overall good health.  Son going through chemotherapy for metastatic cancer spring 2020, was on hospice at one point, very stressful for pt and  and family.    DIAGNOSIS:   07/27/2020   JL:rsw     1.  LEFT BREAST, NIPPLE SPARING MASTECTOMY:   - NO RESIDUAL INVASIVE CARCINOMA SEEN.   - RESIDUAL IN SITU DUCT CARCINOMA AROUND PRIOR SURGERY SITE, SOLID AND    CRIBRIFORM TYPE,    INTERMEDIATE NUCLEAR GRADE, FOCALLY AT ANTERIOR MARGIN.   - SURGICAL REPAIR REACTION.     2.  LEFT AXILLARY SENTINEL LYMPH NODES, EXCISION:   - TWO LYMPH NODES, BOTH NEGATIVE FOR TUMOR (0/2).     3.  LEFT RETROAREOLAR SUPERIOR MARGIN TISSUE, EXCISION:   - NO TUMOR SEEN.   - SURGICAL REPAIR REACTION.    8/18/20 - med onc follow-up - plan for endocrine therapy     9/2/2020 -  Chemotherapy    Treatment Summary   Plan Name: OP BREAST TC - DOCETAXEL CYCLOPHOSPHAMIDE Q3W  Treatment Goal: Curative  Status: Active  Start Date: 9/2/2020 (Planned)  End Date: 11/5/2020 (Planned)  Provider: Bisi Raza MD  Chemotherapy: CYCLOPHOSPHAMIDE INFUSION, 600 mg/m2, Intravenous, Clinic/HOD 1 time, 0 of 4  "cycles  DOCETAXEL CHEMO INFUSION, 75 mg/m2, Intravenous, Clinic/HOD 1 time, 0 of 4 cycles         I have reviewed my initial consult note with detailed PMH/ROS from initial encounter and all following progress notes.   Past medical, surgical, family, and social history were reviewed today and there are no changes of note unless mentioned in HPI.     MEDS and ALLERGIES were reviewed with patient and meds reconciled.     Fatigue  none    Weight/appetite  Good    Constitutional  Denies F/C    Pain  Denies    Sleep  Ok    Mucositis  Denies    Cardiovascular  Denies CP    Respiratory  Denies SOB    Nausea  Denies    BMs  Normal    GI  Denies abdominal pain    Bleeding  Denies epistaxis, hemoptysis, BRBPR, melena, hematuria or any other bleeding    Extremities  Denies edema    Skin/nail/hair  Denies toxicity    Neuropathy  Denies    Psych  In good spirits    Misc  n/a    Exercise  Yes prior to sx          Objective:      BP (!) 156/96 (BP Location: Left arm, Patient Position: Sitting, BP Method: Medium (Automatic))   Pulse 60   Temp 97 °F (36.1 °C) (Temporal)   Resp 20   Ht 5' 5" (1.651 m)   Wt 65.3 kg (143 lb 15.4 oz)   SpO2 96%   BMI 23.96 kg/m²   Wt Readings from Last 30 Encounters:   08/25/20 65.3 kg (143 lb 15.4 oz)   08/19/20 68.8 kg (151 lb 10.8 oz)   07/23/20 68.9 kg (152 lb)   07/09/20 69.3 kg (152 lb 12.5 oz)   05/28/20 67.6 kg (149 lb 0.5 oz)   05/25/20 68 kg (150 lb)   03/10/20 68 kg (150 lb)   08/12/19 68 kg (150 lb)   04/16/19 69.4 kg (153 lb)   01/16/13 64.4 kg (142 lb)   10/29/12 62.2 kg (137 lb 3.2 oz)       Constitutional: Patient is oriented to person, place, and time. Crying on and off through interview related to chemo discussion but calm and pleasant by end of interview.   HENT: Mouth/Throat: Oropharynx is clear and moist. No oropharyngeal exudate.   Eyes: Conjunctivae and EOM are normal.   Neck: Normal range of motion. Neck supple.   Cardiovascular: Normal rate, regular rhythm, normal heart " sounds and intact distal pulses.    Pulmonary/Chest: Effort normal and breath sounds normal. no wheezes. no rales.    Breast:s/p mastectomy bilateral well healing  Abdominal: Soft. Bowel sounds are normal. Patient exhibits no distension. There is no tenderness.   Musculoskeletal: patient exhibits no edema or tenderness.   Lymphadenopathy:  patient  has no cervical adenopathy. no supraclavicular adenopathy. No axillary LAD.   Neurological: Patient is alert and oriented to person, place, and time. normal strength. No cranial nerve deficit or sensory deficit.   Skin: Skin is warm and dry. No rash noted. No erythema.   Psychiatric: Patient has a anxious mood and affect. speech is normal.   Nursing note and vitals reviewed.        Assessment:       1. Invasive ductal carcinoma of left breast    2. Anxiety about health      Cancer Staging  Stage IA    ECOG 0     Patient is a 65 y.o. year old female with new diagnosis left breast cancer per core biopsy on 2/2020 showing papilloma and excisional biopsy on 5/2020 showing invasive papillary carcinoma 9mm in size and oncotype was sent with score of 30. She will be having mastectomy and SLND to be scheduled.      Discussed diagnosis, staging, receptor status, various treatments including surgery, chemotherapy, radiation, endocrine therapy. Discussed recommended treatment plan and benefits/risks, rationale for recommendations based on NCCN guidelines. Patient and family expressed understanding and questions answered to their satisfaction.     For this patient, we discussed in detail her pathology and oncotype score and rationale for recommendations per guidelines and clinical trials to consider chemotherapy and reviewed benefit/risk of all options (chemo vs endocrine/chemo vs scenario change if LN+ at time of surgery. Hand written info and direction given to patient, expressed understanding.     Discussed coping with cancer diagnosis and support given. Especially with history of  son's malignancy, understandable she is emotional and undecided about chemotherapy or not. We did review recommended chemo plan (TC) in detail if she decides to move forward with this.      Discussed exercise, nutrition.      Discussed consider zoledronic acid adjuvant for bone health and per guidelines dec risk of breast ca recurrence  Discussed benefits of exercise and healthy body weight (BMI<25) in decreasing risk of breast cancer recurrence and advised exercise and diet, she expressed understanding.      8/19/20 - long discussion post-op (pathology good, no residual cancer or upstaging) about proceeding with adjuvant chemo vs endocrine, pros cons, oncotype, clinical trials supporting chemo decision and NCCN guidelines, risk of recurrence.      After extensive discussion, she would like to move forward with chemo.   Discussed mgmt of symptoms/chemo side effects, nutrition, weight, activity, exercise, treatment plan in detail.    8/25/20 - here otday to again discuss chemo.her and nelliead have decided against pursuing chemo.discussed at length benefit/risk, guidelines and recommendations. She understands risk of recurrence present and is very comfortable with this decision.          Plan:       Leila PENDLETON was seen today for follow-up.    Diagnoses and all orders for this visit:    Invasive ductal carcinoma of left breast    Anxiety about health            Discontinue chemo plan    Start arimidex    MD vis in 4-6 weeks to review SE on arimidex, tolerability    Exercise/nutrition important, reviewed    Continue current medications, reviewed.      Important to keep follow-up with PCP and surgery.     Discussed plan above in great detail with patient and all questions answered to their satisfaction. Proceed with plan above.    Greater than 50% of visit today was spent in discussing condition and continued treatment plan, counseling, education, for >40 minutes.        Bisi Raza M.D.  Hematology  Oncology  [facility]  [facility]

## 2020-09-29 ENCOUNTER — OFFICE VISIT (OUTPATIENT)
Dept: HEMATOLOGY/ONCOLOGY | Facility: CLINIC | Age: 65
End: 2020-09-29
Payer: MEDICARE

## 2020-09-29 VITALS
HEART RATE: 64 BPM | HEIGHT: 65 IN | DIASTOLIC BLOOD PRESSURE: 84 MMHG | WEIGHT: 146.63 LBS | TEMPERATURE: 98 F | BODY MASS INDEX: 24.43 KG/M2 | SYSTOLIC BLOOD PRESSURE: 167 MMHG | RESPIRATION RATE: 18 BRPM | OXYGEN SATURATION: 100 %

## 2020-09-29 DIAGNOSIS — M54.50 CHRONIC MIDLINE LOW BACK PAIN WITHOUT SCIATICA: ICD-10-CM

## 2020-09-29 DIAGNOSIS — R45.89 ANXIETY ABOUT HEALTH: ICD-10-CM

## 2020-09-29 DIAGNOSIS — G47.00 INSOMNIA, UNSPECIFIED TYPE: ICD-10-CM

## 2020-09-29 DIAGNOSIS — M85.80 OSTEOPENIA, UNSPECIFIED LOCATION: ICD-10-CM

## 2020-09-29 DIAGNOSIS — G89.29 CHRONIC MIDLINE LOW BACK PAIN WITHOUT SCIATICA: ICD-10-CM

## 2020-09-29 DIAGNOSIS — C50.912 INVASIVE DUCTAL CARCINOMA OF LEFT BREAST: Primary | ICD-10-CM

## 2020-09-29 PROCEDURE — 99999 PR PBB SHADOW E&M-EST. PATIENT-LVL III: ICD-10-PCS | Mod: PBBFAC,,, | Performed by: INTERNAL MEDICINE

## 2020-09-29 PROCEDURE — 99213 OFFICE O/P EST LOW 20 MIN: CPT | Mod: PBBFAC,PN | Performed by: INTERNAL MEDICINE

## 2020-09-29 PROCEDURE — 99999 PR PBB SHADOW E&M-EST. PATIENT-LVL III: CPT | Mod: PBBFAC,,, | Performed by: INTERNAL MEDICINE

## 2020-09-29 PROCEDURE — 99214 PR OFFICE/OUTPT VISIT, EST, LEVL IV, 30-39 MIN: ICD-10-PCS | Mod: S$PBB,,, | Performed by: INTERNAL MEDICINE

## 2020-09-29 PROCEDURE — 99214 OFFICE O/P EST MOD 30 MIN: CPT | Mod: S$PBB,,, | Performed by: INTERNAL MEDICINE

## 2020-09-29 RX ORDER — TRAZODONE HYDROCHLORIDE 50 MG/1
50 TABLET ORAL NIGHTLY
Qty: 30 TABLET | Refills: 11 | Status: SHIPPED | OUTPATIENT
Start: 2020-09-29 | End: 2023-04-19

## 2020-09-29 NOTE — PROGRESS NOTES
"FOLLOW-UP VISIT    Patient name: Leila Stafford  Date: 9/29/20      Subjective:       Patient ID: Leila Stafford is a 65 y.o. female.    Chief Complaint: Follow-up    HPI  Doing okay today after recent start AI.    insmonia issue that is new. There are no other changes  She does have anxeity/stress at baseline that oculd contribute, "mind racing", son with cancer in his 30's remains on immunotherapy and stable but this remains source of stress.     ambien in past but hangover effect    Exercise - walking increased. Adjusting to breast reconstruction.         See detailed oncology history below, updated with most recent scans, labs, pertinent medical history.   Oncology History   Malignant neoplasm of upper-outer quadrant of left breast in female, estrogen receptor positive   7/22/2020 Initial Diagnosis    Malignant neoplasm of upper-outer quadrant of left breast in female, estrogen receptor positive    Medical oncology history for review prior to visit:  02/20/2019-mammogram screening with rosalio done at [place]-bilateral breast implants are normal and symmetric in shape and position.  There has been interval development of a 3 mm density in the inner posterior depth of the right breast with obscured margins, recommend ultrasound.  02/20/2019-mammogram diagnostic bilateral rosalio done at [place]-impression is post biopsy changes in the anterior left breast continued yearly follow-up is recommended unless clinically necessary otherwise  02/26/2019-ultrasound breast complete right-simple cysts corresponds to the mammographic abnormality resumption of routine mammographic surveillance recommended.  [facility] location mammogram  02/13/2020-left breast mass at 12:00 p.m.- core biopsy              Pathology:  papilloma with atypia  05/26/2020-mammogram left with rosalio and ultrasound-              Palpable abnormality at the left 1:00 a.m. corresponds to an irregular solid mass with cystic components measuring 29 x 11 x " 18 mm extending towards the nipple.  No abnormal lymph nodes are seen in the axilla.  05/28/2020-left breast wide excision core needle biopsy site-              Pathology:  Invasive papillary carcinoma 0.9 cm, extending to superior edge.              Left breast re-excision superior margin:  No tumor seen.              Right breast capsule excision-no tumor seen              Left breast capsule excision-no tumor seen              Receptors:  ER 98.2% positive, MI 1% positive, HER2 negative, Ki 67 is 38.3%              PT1b pNx     06/14/2020-[facility] genetic testing-negative, 47 genes tested  06/30/2020-Oncotype recurrence score-30  Distant recurrence risk at 9 years with tamoxifen alone is 19%  Group average absolute chemotherapy benefit of recurrence score  is greater than 15%     7/9/20 - initial consult:  Patient confirms above history and reports overall good health.  Son going through chemotherapy for metastatic cancer spring 2020, was on hospice at one point, very stressful for pt and  and family.    DIAGNOSIS:   07/27/2020   JL:rsw     1.  LEFT BREAST, NIPPLE SPARING MASTECTOMY:   - NO RESIDUAL INVASIVE CARCINOMA SEEN.   - RESIDUAL IN SITU DUCT CARCINOMA AROUND PRIOR SURGERY SITE, SOLID AND    CRIBRIFORM TYPE,    INTERMEDIATE NUCLEAR GRADE, FOCALLY AT ANTERIOR MARGIN.   - SURGICAL REPAIR REACTION.     2.  LEFT AXILLARY SENTINEL LYMPH NODES, EXCISION:   - TWO LYMPH NODES, BOTH NEGATIVE FOR TUMOR (0/2).     3.  LEFT RETROAREOLAR SUPERIOR MARGIN TISSUE, EXCISION:   - NO TUMOR SEEN.   - SURGICAL REPAIR REACTION.    8/18/20 - med onc follow-up - plan for endocrine therapy  9/3/20 - DEXA  9/29/20 - med onc follow-up     9/1/2020 Cancer Staged    Staging form: Breast, AJCC 8th Edition  - Clinical: Stage IA (cT1b, cN0, cM0, G2, ER+, MI+, HER2-, Oncotype DX score: 30)     9/2/2020 - 9/2/2020 Chemotherapy    Treatment Summary   Plan Name: OP BREAST TC - DOCETAXEL CYCLOPHOSPHAMIDE Q3W  Treatment Goal:  "Curative  Status: Inactive  Start Date:   End Date:   Provider: Bisi Raza MD  Chemotherapy: cyclophosphamide (CYTOXAN) 600 mg/m2 = 1,070 mg in sodium chloride 0.9% 250 mL chemo infusion, 600 mg/m2, Intravenous, Clinic/HOD 1 time, 0 of 4 cycles  DOCEtaxeL (TAXOTERE) 75 mg/m2 = 135 mg in sodium chloride 0.9% 250 mL chemo infusion, 75 mg/m2, Intravenous, Clinic/HOD 1 time, 0 of 4 cycles         I have reviewed my initial consult note with detailed PMH/ROS from initial encounter and all following progress notes.   Past medical, surgical, family, and social history were reviewed today and there are no changes of note unless mentioned in HPI.     MEDS and ALLERGIES were reviewed with patient and meds reconciled.     Fatigue  Not severe    Weight/appetite  stable    Constitutional  no F/C or SOI   Pain  none    Cardiovascular  no change in CP or palpitations   Respiratory  no change in SOB or cough   GI  No new abdominal pain or major change in BM   Bleeding  No new bleeding   Extremities  No new edema           Objective:      BP (!) 167/84 (BP Location: Right arm, Patient Position: Sitting)   Pulse 64   Temp 97.5 °F (36.4 °C) (Temporal)   Resp 18   Ht 5' 5" (1.651 m)   Wt 66.5 kg (146 lb 9.7 oz)   SpO2 100%   BMI 24.40 kg/m²   Wt Readings from Last 30 Encounters:   09/29/20 66.5 kg (146 lb 9.7 oz)   09/01/20 68 kg (149 lb 14.6 oz)   08/27/20 68 kg (150 lb)   08/25/20 65.3 kg (143 lb 15.4 oz)   08/19/20 68.8 kg (151 lb 10.8 oz)   07/23/20 68.9 kg (152 lb)   07/09/20 69.3 kg (152 lb 12.5 oz)   05/28/20 67.6 kg (149 lb 0.5 oz)   05/25/20 68 kg (150 lb)   03/10/20 68 kg (150 lb)   08/12/19 68 kg (150 lb)   04/16/19 69.4 kg (153 lb)   01/16/13 64.4 kg (142 lb)   10/29/12 62.2 kg (137 lb 3.2 oz)       Constitutional: Patient is oriented to person, place, and time. No distress.   HENT:    Eyes: Conjunctivae and EOM are normal.   Neck: Normal range of motion. Neck supple.   Cardiovascular: Normal rate, regular " rhythm, normal heart sounds and intact distal pulses.    Pulmonary/Chest: Effort normal and breath sounds normal. no wheezes. no rales.    Breast s/p bilateraly mastectomy with recon  Abdominal: Soft. Bowel sounds are normal. Patient exhibits no distension. There is no tenderness.   Musculoskeletal: patient exhibits no edema or tenderness.   Lymphadenopathy:  patient  has no cervical adenopathy. no supraclavicular adenopathy. No axillary LAD.   Neurological: Patient is alert and oriented to person, place, and time. normal strength. No cranial nerve deficit or sensory deficit.   Skin: Skin is warm and dry. No rash noted. No erythema.   Psychiatric: Patient has a normal mood and affect. speech is normal.   Nursing note and vitals reviewed.      Assessment:       1. Invasive ductal carcinoma of left breast    2. Chronic midline low back pain without sciatica    3. Anxiety about health    4. Osteopenia, unspecified location      Cancer Staging  Malignant neoplasm of upper-outer quadrant of left breast in female, estrogen receptor positive  Staging form: Breast, AJCC 8th Edition  - Clinical: Stage IA (cT1b, cN0, cM0, G2, ER+, SC+, HER2-, Oncotype DX score: 30) - Signed by Myah Parks NP on 9/1/2020    ECOG 0     Patient is a 65 y.o. year old female with new diagnosis left breast cancer per core biopsy on 2/2020 showing papilloma and excisional biopsy on 5/2020 showing invasive papillary carcinoma 9mm in size and oncotype was sent with score of 30. She will be having mastectomy and SLND to be scheduled.      Discussed diagnosis, staging, receptor status, various treatments including surgery, chemotherapy, radiation, endocrine therapy. Discussed recommended treatment plan and benefits/risks, rationale for recommendations based on NCCN guidelines. Patient and family expressed understanding and questions answered to their satisfaction.     For this patient, we discussed in detail her pathology and oncotype score and  rationale for recommendations per guidelines and clinical trials to consider chemotherapy and reviewed benefit/risk of all options (chemo vs endocrine/chemo vs scenario change if LN+ at time of surgery. Hand written info and direction given to patient, expressed understanding.     Discussed coping with cancer diagnosis and support given. Especially with history of son's malignancy, understandable she is emotional and undecided about chemotherapy or not. We did review recommended chemo plan (TC) in detail if she decides to move forward with this.      Discussed exercise, nutrition.      Discussed consider zoledronic acid adjuvant for bone health and per guidelines dec risk of breast ca recurrence  Discussed benefits of exercise and healthy body weight (BMI<25) in decreasing risk of breast cancer recurrence and advised exercise and diet, she expressed understanding.      8/19/20 - long discussion post-op (pathology good, no residual cancer or upstaging) about proceeding with adjuvant chemo vs endocrine, pros cons, oncotype, clinical trials supporting chemo decision and NCCN guidelines, risk of recurrence.      After extensive discussion, she would like to move forward with chemo.   Discussed mgmt of symptoms/chemo side effects, nutrition, weight, activity, exercise, treatment plan in detail.     8/25/20 - here otday to again discuss chemo.her and husbnad have decided against pursuing chemo.discussed at length benefit/risk, guidelines and recommendations. She understands risk of recurrence present and is very comfortable with this decision.     9/29/20 - here today for breast ca surveillance follow up after start AI and doing well. Plan for 5 years then re-eval at that time duration.     Also with osteopenia - Discussed consider zoledronic acid adjuvant for bone health and per guidelines dec risk of breast ca recurrence. She will see dentist and plan for this in future, has pending implant replacement.  Discussed  benefits of exercise and healthy body weight (BMI<25) in decreasing risk of breast cancer recurrence and advised exercise and diet, she expressed understanding.                Plan:       Leila PENDLETON was seen today for follow-up.    Diagnoses and all orders for this visit:    Invasive ductal carcinoma of left breast    Chronic midline low back pain without sciatica    Anxiety about health    Osteopenia, unspecified location            meds continue:  Continue AI  Vit D calcium     Will start zometa when pt has dental clearance, needs implant, she will inform us, expect to start in a few months    MD vis in 4 mo and will need Vit D, cbc, cmp, mag, phos    Exercise/nutrition important, reviewed    Continue current medications, reviewed.      Important to keep follow-up with PCP and surgery.     Discussed plan above in great detail with patient and all questions answered to their satisfaction. Proceed with plan above.          Bisi Raza M.D.  Hematology Oncology  [facility]  [place]

## 2020-10-15 ENCOUNTER — TELEPHONE (OUTPATIENT)
Dept: HEMATOLOGY/ONCOLOGY | Facility: CLINIC | Age: 65
End: 2020-10-15

## 2020-10-15 NOTE — TELEPHONE ENCOUNTER
----- Message from Daphne Babb sent at 10/15/2020 10:00 AM CDT -----  Regarding: appointment access  Contact: JAMAL QUINN [[id number]]  Patient is requesting a call back from the nurse to schedule an appointment.  Please call the patient upon request at phone number [phone].

## 2020-10-15 NOTE — TELEPHONE ENCOUNTER
----- Message from Daphne Babb sent at 10/15/2020 10:00 AM CDT -----  Regarding: appointment access  Contact: JAMAL QUINN [[id number]]  Patient is requesting a call back from the nurse to schedule an appointment.  Please call the patient upon request at phone number [phone].

## 2020-12-24 ENCOUNTER — PATIENT MESSAGE (OUTPATIENT)
Dept: HEMATOLOGY/ONCOLOGY | Facility: CLINIC | Age: 65
End: 2020-12-24

## 2021-02-01 ENCOUNTER — OFFICE VISIT (OUTPATIENT)
Dept: HEMATOLOGY/ONCOLOGY | Facility: CLINIC | Age: 66
End: 2021-02-01
Payer: MEDICARE

## 2021-02-01 ENCOUNTER — LAB VISIT (OUTPATIENT)
Dept: LAB | Facility: HOSPITAL | Age: 66
End: 2021-02-01
Attending: INTERNAL MEDICINE
Payer: MEDICARE

## 2021-02-01 VITALS
WEIGHT: 137.56 LBS | DIASTOLIC BLOOD PRESSURE: 81 MMHG | HEART RATE: 59 BPM | SYSTOLIC BLOOD PRESSURE: 131 MMHG | TEMPERATURE: 98 F | OXYGEN SATURATION: 99 % | RESPIRATION RATE: 12 BRPM | BODY MASS INDEX: 22.89 KG/M2

## 2021-02-01 DIAGNOSIS — M54.50 CHRONIC MIDLINE LOW BACK PAIN WITHOUT SCIATICA: ICD-10-CM

## 2021-02-01 DIAGNOSIS — R45.89 ANXIETY ABOUT HEALTH: ICD-10-CM

## 2021-02-01 DIAGNOSIS — G47.00 INSOMNIA, UNSPECIFIED TYPE: ICD-10-CM

## 2021-02-01 DIAGNOSIS — M85.80 OSTEOPENIA, UNSPECIFIED LOCATION: ICD-10-CM

## 2021-02-01 DIAGNOSIS — C50.912 INVASIVE DUCTAL CARCINOMA OF LEFT BREAST: Primary | ICD-10-CM

## 2021-02-01 DIAGNOSIS — Z90.13 S/P BILATERAL MASTECTOMY: ICD-10-CM

## 2021-02-01 DIAGNOSIS — C50.912 INVASIVE DUCTAL CARCINOMA OF LEFT BREAST: ICD-10-CM

## 2021-02-01 DIAGNOSIS — G89.29 CHRONIC MIDLINE LOW BACK PAIN WITHOUT SCIATICA: ICD-10-CM

## 2021-02-01 LAB
25(OH)D3+25(OH)D2 SERPL-MCNC: 47 NG/ML (ref 30–96)
ALBUMIN SERPL BCP-MCNC: 4.1 G/DL (ref 3.5–5.2)
ALP SERPL-CCNC: 58 U/L (ref 38–145)
ALT SERPL W/O P-5'-P-CCNC: 16 U/L (ref 0–35)
ANION GAP SERPL CALC-SCNC: 7 MMOL/L (ref 8–16)
AST SERPL-CCNC: 28 U/L (ref 14–36)
BASOPHILS # BLD AUTO: 0.05 K/UL (ref 0–0.2)
BASOPHILS NFR BLD: 0.7 % (ref 0–1.9)
BILIRUB SERPL-MCNC: 0.7 MG/DL (ref 0.2–1.3)
CALCIUM SERPL-MCNC: 9.5 MG/DL (ref 8.4–10.2)
CHLORIDE SERPL-SCNC: 101 MMOL/L (ref 95–110)
CO2 SERPL-SCNC: 32 MMOL/L (ref 22–31)
CREAT SERPL-MCNC: 1.06 MG/DL (ref 0.5–1.4)
DIFFERENTIAL METHOD: ABNORMAL
EOSINOPHIL # BLD AUTO: 0.2 K/UL (ref 0–0.5)
EOSINOPHIL NFR BLD: 2.4 % (ref 0–8)
ERYTHROCYTE [DISTWIDTH] IN BLOOD BY AUTOMATED COUNT: 13.7 % (ref 11.5–14.5)
EST. GFR  (AFRICAN AMERICAN): >60 ML/MIN/1.73 M^2
EST. GFR  (NON AFRICAN AMERICAN): 55 ML/MIN/1.73 M^2
GLUCOSE SERPL-MCNC: 119 MG/DL (ref 70–110)
HCT VFR BLD AUTO: 44.3 % (ref 37–48.5)
HGB BLD-MCNC: 13.4 G/DL (ref 12–16)
IMM GRANULOCYTES # BLD AUTO: 0.02 K/UL (ref 0–0.04)
IMM GRANULOCYTES NFR BLD AUTO: 0.3 % (ref 0–0.5)
LYMPHOCYTES # BLD AUTO: 1.6 K/UL (ref 1–4.8)
LYMPHOCYTES NFR BLD: 22.3 % (ref 18–48)
MAGNESIUM SERPL-MCNC: 1.8 MG/DL (ref 1.6–2.6)
MCH RBC QN AUTO: 27 PG (ref 27–31)
MCHC RBC AUTO-ENTMCNC: 30.2 G/DL (ref 32–36)
MCV RBC AUTO: 89 FL (ref 82–98)
MONOCYTES # BLD AUTO: 0.5 K/UL (ref 0.3–1)
MONOCYTES NFR BLD: 7.3 % (ref 4–15)
NEUTROPHILS # BLD AUTO: 4.8 K/UL (ref 1.8–7.7)
NEUTROPHILS NFR BLD: 67 % (ref 38–73)
NRBC BLD-RTO: 0 /100 WBC
PHOSPHATE SERPL-MCNC: 3.4 MG/DL (ref 2.7–4.5)
PLATELET # BLD AUTO: 261 K/UL (ref 150–350)
PMV BLD AUTO: 9.7 FL (ref 9.2–12.9)
POTASSIUM SERPL-SCNC: 3.5 MMOL/L (ref 3.5–5.1)
PROT SERPL-MCNC: 7 G/DL (ref 6–8.4)
RBC # BLD AUTO: 4.97 M/UL (ref 4–5.4)
SODIUM SERPL-SCNC: 140 MMOL/L (ref 136–145)
UUN UR-MCNC: 18 MG/DL (ref 7–18)
WBC # BLD AUTO: 7.23 K/UL (ref 3.9–12.7)

## 2021-02-01 PROCEDURE — 83735 ASSAY OF MAGNESIUM: CPT

## 2021-02-01 PROCEDURE — 82306 VITAMIN D 25 HYDROXY: CPT

## 2021-02-01 PROCEDURE — 85025 COMPLETE CBC W/AUTO DIFF WBC: CPT

## 2021-02-01 PROCEDURE — 99215 PR OFFICE/OUTPT VISIT, EST, LEVL V, 40-54 MIN: ICD-10-PCS | Mod: S$PBB,,, | Performed by: INTERNAL MEDICINE

## 2021-02-01 PROCEDURE — 99999 PR PBB SHADOW E&M-EST. PATIENT-LVL IV: CPT | Mod: PBBFAC,,, | Performed by: INTERNAL MEDICINE

## 2021-02-01 PROCEDURE — 99214 OFFICE O/P EST MOD 30 MIN: CPT | Mod: PBBFAC,PN | Performed by: INTERNAL MEDICINE

## 2021-02-01 PROCEDURE — 99999 PR PBB SHADOW E&M-EST. PATIENT-LVL IV: ICD-10-PCS | Mod: PBBFAC,,, | Performed by: INTERNAL MEDICINE

## 2021-02-01 PROCEDURE — 36415 COLL VENOUS BLD VENIPUNCTURE: CPT | Mod: PN

## 2021-02-01 PROCEDURE — 84100 ASSAY OF PHOSPHORUS: CPT | Mod: PN

## 2021-02-01 PROCEDURE — 99215 OFFICE O/P EST HI 40 MIN: CPT | Mod: S$PBB,,, | Performed by: INTERNAL MEDICINE

## 2021-02-01 PROCEDURE — 80053 COMPREHEN METABOLIC PANEL: CPT | Mod: PN

## 2021-02-01 PROCEDURE — 80053 COMPREHEN METABOLIC PANEL: CPT

## 2021-02-01 PROCEDURE — 83735 ASSAY OF MAGNESIUM: CPT | Mod: PN

## 2021-02-01 PROCEDURE — 84100 ASSAY OF PHOSPHORUS: CPT

## 2021-02-01 PROCEDURE — 82306 VITAMIN D 25 HYDROXY: CPT | Mod: PN

## 2021-02-01 PROCEDURE — 85025 COMPLETE CBC W/AUTO DIFF WBC: CPT | Mod: PN

## 2021-02-01 RX ORDER — CELECOXIB 200 MG/1
CAPSULE ORAL
COMMUNITY
Start: 2020-12-24 | End: 2021-07-16

## 2021-02-02 ENCOUNTER — PATIENT MESSAGE (OUTPATIENT)
Dept: HEMATOLOGY/ONCOLOGY | Facility: CLINIC | Age: 66
End: 2021-02-02

## 2021-02-11 ENCOUNTER — PATIENT MESSAGE (OUTPATIENT)
Dept: HEMATOLOGY/ONCOLOGY | Facility: CLINIC | Age: 66
End: 2021-02-11

## 2021-06-01 ENCOUNTER — PATIENT MESSAGE (OUTPATIENT)
Dept: HEMATOLOGY/ONCOLOGY | Facility: CLINIC | Age: 66
End: 2021-06-01

## 2021-06-02 ENCOUNTER — OFFICE VISIT (OUTPATIENT)
Dept: HEMATOLOGY/ONCOLOGY | Facility: CLINIC | Age: 66
End: 2021-06-02
Payer: MEDICARE

## 2021-06-02 VITALS
DIASTOLIC BLOOD PRESSURE: 88 MMHG | HEART RATE: 58 BPM | HEIGHT: 65 IN | WEIGHT: 142.88 LBS | OXYGEN SATURATION: 99 % | BODY MASS INDEX: 23.81 KG/M2 | RESPIRATION RATE: 18 BRPM | SYSTOLIC BLOOD PRESSURE: 144 MMHG

## 2021-06-02 DIAGNOSIS — M54.50 CHRONIC MIDLINE LOW BACK PAIN WITHOUT SCIATICA: ICD-10-CM

## 2021-06-02 DIAGNOSIS — Z90.13 S/P BILATERAL MASTECTOMY: ICD-10-CM

## 2021-06-02 DIAGNOSIS — M85.80 OSTEOPENIA, UNSPECIFIED LOCATION: ICD-10-CM

## 2021-06-02 DIAGNOSIS — G89.29 CHRONIC MIDLINE LOW BACK PAIN WITHOUT SCIATICA: ICD-10-CM

## 2021-06-02 DIAGNOSIS — G47.00 INSOMNIA, UNSPECIFIED TYPE: ICD-10-CM

## 2021-06-02 DIAGNOSIS — R45.89 ANXIETY ABOUT HEALTH: ICD-10-CM

## 2021-06-02 DIAGNOSIS — C50.912 INVASIVE DUCTAL CARCINOMA OF LEFT BREAST: Primary | ICD-10-CM

## 2021-06-02 PROCEDURE — 99999 PR PBB SHADOW E&M-EST. PATIENT-LVL IV: CPT | Mod: PBBFAC,,, | Performed by: INTERNAL MEDICINE

## 2021-06-02 PROCEDURE — 99214 OFFICE O/P EST MOD 30 MIN: CPT | Mod: PBBFAC,PN | Performed by: INTERNAL MEDICINE

## 2021-06-02 PROCEDURE — 99999 PR PBB SHADOW E&M-EST. PATIENT-LVL IV: ICD-10-PCS | Mod: PBBFAC,,, | Performed by: INTERNAL MEDICINE

## 2021-06-02 PROCEDURE — 99214 OFFICE O/P EST MOD 30 MIN: CPT | Mod: S$PBB,,, | Performed by: INTERNAL MEDICINE

## 2021-06-02 PROCEDURE — 99214 PR OFFICE/OUTPT VISIT, EST, LEVL IV, 30-39 MIN: ICD-10-PCS | Mod: S$PBB,,, | Performed by: INTERNAL MEDICINE

## 2021-06-02 RX ORDER — ACYCLOVIR 50 MG/G
1 OINTMENT TOPICAL EVERY 4 HOURS
COMMUNITY
Start: 2021-03-16 | End: 2021-07-16

## 2021-06-02 RX ORDER — AMOXICILLIN AND CLAVULANATE POTASSIUM 875; 125 MG/1; MG/1
TABLET, FILM COATED ORAL
COMMUNITY
Start: 2021-03-04 | End: 2021-07-16

## 2021-06-02 RX ORDER — DIAZEPAM 5 MG/1
5 TABLET ORAL NIGHTLY
COMMUNITY
Start: 2021-05-20 | End: 2021-07-16

## 2021-06-02 RX ORDER — ACYCLOVIR 800 MG/1
800 TABLET ORAL
COMMUNITY
Start: 2021-03-16 | End: 2021-07-16

## 2021-06-02 RX ORDER — CHLORHEXIDINE GLUCONATE ORAL RINSE 1.2 MG/ML
SOLUTION DENTAL
COMMUNITY
Start: 2021-03-04 | End: 2021-07-16

## 2021-06-10 ENCOUNTER — TELEPHONE (OUTPATIENT)
Dept: HEMATOLOGY/ONCOLOGY | Facility: CLINIC | Age: 66
End: 2021-06-10

## 2021-06-16 ENCOUNTER — LAB VISIT (OUTPATIENT)
Dept: LAB | Facility: HOSPITAL | Age: 66
End: 2021-06-16
Attending: INTERNAL MEDICINE
Payer: MEDICARE

## 2021-06-16 DIAGNOSIS — C50.912 INVASIVE DUCTAL CARCINOMA OF LEFT BREAST: ICD-10-CM

## 2021-06-16 LAB
25(OH)D3+25(OH)D2 SERPL-MCNC: 46 NG/ML (ref 30–96)
ALBUMIN SERPL BCP-MCNC: 3.9 G/DL (ref 3.5–5.2)
ALP SERPL-CCNC: 69 U/L (ref 55–135)
ALT SERPL W/O P-5'-P-CCNC: 18 U/L (ref 10–44)
ANION GAP SERPL CALC-SCNC: 9 MMOL/L (ref 8–16)
AST SERPL-CCNC: 20 U/L (ref 10–40)
BILIRUB SERPL-MCNC: 0.6 MG/DL (ref 0.1–1)
BUN SERPL-MCNC: 12 MG/DL (ref 8–23)
CALCIUM SERPL-MCNC: 9.6 MG/DL (ref 8.7–10.5)
CHLORIDE SERPL-SCNC: 106 MMOL/L (ref 95–110)
CO2 SERPL-SCNC: 29 MMOL/L (ref 23–29)
CREAT SERPL-MCNC: 1.1 MG/DL (ref 0.5–1.4)
EST. GFR  (AFRICAN AMERICAN): >60 ML/MIN/1.73 M^2
EST. GFR  (NON AFRICAN AMERICAN): 52 ML/MIN/1.73 M^2
GLUCOSE SERPL-MCNC: 79 MG/DL (ref 70–110)
PHOSPHATE SERPL-MCNC: 3.3 MG/DL (ref 2.7–4.5)
POTASSIUM SERPL-SCNC: 4 MMOL/L (ref 3.5–5.1)
PROT SERPL-MCNC: 6.9 G/DL (ref 6–8.4)
SODIUM SERPL-SCNC: 144 MMOL/L (ref 136–145)

## 2021-06-16 PROCEDURE — 80053 COMPREHEN METABOLIC PANEL: CPT | Mod: PO | Performed by: INTERNAL MEDICINE

## 2021-06-16 PROCEDURE — 36415 COLL VENOUS BLD VENIPUNCTURE: CPT | Mod: PO | Performed by: INTERNAL MEDICINE

## 2021-06-16 PROCEDURE — 82306 VITAMIN D 25 HYDROXY: CPT | Performed by: INTERNAL MEDICINE

## 2021-06-16 PROCEDURE — 84100 ASSAY OF PHOSPHORUS: CPT | Mod: PO | Performed by: INTERNAL MEDICINE

## 2021-06-21 ENCOUNTER — INFUSION (OUTPATIENT)
Dept: INFUSION THERAPY | Facility: HOSPITAL | Age: 66
End: 2021-06-21
Attending: INTERNAL MEDICINE
Payer: MEDICARE

## 2021-06-21 ENCOUNTER — DOCUMENTATION ONLY (OUTPATIENT)
Dept: INFUSION THERAPY | Facility: HOSPITAL | Age: 66
End: 2021-06-21

## 2021-06-21 VITALS
WEIGHT: 140.88 LBS | DIASTOLIC BLOOD PRESSURE: 83 MMHG | SYSTOLIC BLOOD PRESSURE: 135 MMHG | HEART RATE: 62 BPM | HEIGHT: 65 IN | TEMPERATURE: 98 F | BODY MASS INDEX: 23.47 KG/M2 | RESPIRATION RATE: 18 BRPM

## 2021-06-21 DIAGNOSIS — C50.412 MALIGNANT NEOPLASM OF UPPER-OUTER QUADRANT OF LEFT BREAST IN FEMALE, ESTROGEN RECEPTOR POSITIVE: Primary | ICD-10-CM

## 2021-06-21 DIAGNOSIS — Z17.0 MALIGNANT NEOPLASM OF UPPER-OUTER QUADRANT OF LEFT BREAST IN FEMALE, ESTROGEN RECEPTOR POSITIVE: Primary | ICD-10-CM

## 2021-06-21 PROCEDURE — 25000003 PHARM REV CODE 250: Mod: PN | Performed by: INTERNAL MEDICINE

## 2021-06-21 PROCEDURE — 96365 THER/PROPH/DIAG IV INF INIT: CPT | Mod: PN

## 2021-06-21 PROCEDURE — 63600175 PHARM REV CODE 636 W HCPCS: Mod: PN | Performed by: INTERNAL MEDICINE

## 2021-06-21 RX ORDER — ZOLEDRONIC ACID 0.04 MG/ML
4 INJECTION, SOLUTION INTRAVENOUS
Status: CANCELLED | OUTPATIENT
Start: 2021-12-21

## 2021-06-21 RX ORDER — ZOLEDRONIC ACID 0.04 MG/ML
4 INJECTION, SOLUTION INTRAVENOUS
Status: DISCONTINUED | OUTPATIENT
Start: 2021-06-21 | End: 2021-06-21 | Stop reason: SDUPTHER

## 2021-06-21 RX ORDER — HEPARIN 100 UNIT/ML
500 SYRINGE INTRAVENOUS
Status: CANCELLED | OUTPATIENT
Start: 2021-12-21

## 2021-06-21 RX ORDER — SODIUM CHLORIDE 0.9 % (FLUSH) 0.9 %
10 SYRINGE (ML) INJECTION
Status: CANCELLED | OUTPATIENT
Start: 2021-12-21

## 2021-06-21 RX ORDER — HEPARIN 100 UNIT/ML
500 SYRINGE INTRAVENOUS
Status: CANCELLED | OUTPATIENT
Start: 2021-06-21

## 2021-06-21 RX ORDER — SODIUM CHLORIDE 0.9 % (FLUSH) 0.9 %
10 SYRINGE (ML) INJECTION
Status: CANCELLED | OUTPATIENT
Start: 2021-06-21

## 2021-06-21 RX ADMIN — ZOLEDRONIC ACID 3.3 MG: 4 INJECTION INTRAVENOUS at 12:06

## 2021-06-21 RX ADMIN — SODIUM CHLORIDE: 9 INJECTION, SOLUTION INTRAVENOUS at 12:06

## 2021-06-22 ENCOUNTER — TELEPHONE (OUTPATIENT)
Dept: HEMATOLOGY/ONCOLOGY | Facility: CLINIC | Age: 66
End: 2021-06-22

## 2021-08-30 ENCOUNTER — PATIENT MESSAGE (OUTPATIENT)
Dept: INFUSION THERAPY | Facility: HOSPITAL | Age: 66
End: 2021-08-30

## 2021-09-14 ENCOUNTER — TELEPHONE (OUTPATIENT)
Dept: INFUSION THERAPY | Facility: HOSPITAL | Age: 66
End: 2021-09-14
Payer: MEDICARE

## 2021-09-15 ENCOUNTER — TELEPHONE (OUTPATIENT)
Dept: HEMATOLOGY/ONCOLOGY | Facility: CLINIC | Age: 66
End: 2021-09-15

## 2021-10-21 ENCOUNTER — TELEPHONE (OUTPATIENT)
Dept: HEMATOLOGY/ONCOLOGY | Facility: CLINIC | Age: 66
End: 2021-10-21

## 2022-02-23 ENCOUNTER — PATIENT OUTREACH (OUTPATIENT)
Dept: ADMINISTRATIVE | Facility: HOSPITAL | Age: 67
End: 2022-02-23
Payer: OTHER GOVERNMENT

## 2022-02-23 NOTE — PROGRESS NOTES
Non-compliant report chart audits. Chart review completed for HM test overdue (mammograms, Colonoscopies, pap smears, DM labs, and/or EYE EXAMs)    Care Everywhere and media, updates requested and reviewed.      [facility] and [facility] reviewed.      Requested colonoscopy records.         NEEDS:  FALL RISK SCREEN

## 2022-02-23 NOTE — LETTER
AUTHORIZATION FOR RELEASE OF   CONFIDENTIAL INFORMATION    Dear Homer Cerna MD,    We are seeing Leila Stafford, date of birth 1955, in the clinic at [facility] DR. MARGUERITE AVILA. Marguerite Avila MD is the patient's PCP. Leila Stafford has an outstanding lab/procedure at the time we reviewed her chart. In order to help keep her health information updated, she has authorized us to request the following medical record(s):        (  )  MAMMOGRAM                                      ( X)  COLONOSCOPY      (  )  PAP SMEAR                                          (  )  OUTSIDE LAB RESULTS     (  )  DEXA SCAN                                          (  )  EYE EXAM            (  )  FOOT EXAM                                          (  )  ENTIRE RECORD     (  )  OUTSIDE IMMUNIZATIONS                 (  )  _______________         Please fax records to [facility], Elizabeth B White, MD, [phone].     If you have any questions, please contact Caroline at [phone].    Caroline Corona LPN Care Coordinator  [facility]  Phone: [phone]  FAX: [phone]           Patient Name: Leila Stafford  : 1955  Patient Phone #: [phone]

## 2022-03-11 ENCOUNTER — LAB VISIT (OUTPATIENT)
Dept: LAB | Facility: HOSPITAL | Age: 67
End: 2022-03-11
Payer: OTHER GOVERNMENT

## 2022-03-11 DIAGNOSIS — C50.912 INVASIVE DUCTAL CARCINOMA OF LEFT BREAST: ICD-10-CM

## 2022-03-11 LAB
ALBUMIN SERPL BCP-MCNC: 3.7 G/DL (ref 3.5–5.2)
ALP SERPL-CCNC: 62 U/L (ref 55–135)
ALT SERPL W/O P-5'-P-CCNC: 17 U/L (ref 10–44)
ANION GAP SERPL CALC-SCNC: 11 MMOL/L (ref 8–16)
AST SERPL-CCNC: 20 U/L (ref 10–40)
BILIRUB SERPL-MCNC: 0.9 MG/DL (ref 0.1–1)
BUN SERPL-MCNC: 13 MG/DL (ref 8–23)
CALCIUM SERPL-MCNC: 9.9 MG/DL (ref 8.7–10.5)
CHLORIDE SERPL-SCNC: 103 MMOL/L (ref 95–110)
CO2 SERPL-SCNC: 28 MMOL/L (ref 23–29)
CREAT SERPL-MCNC: 1 MG/DL (ref 0.5–1.4)
EST. GFR  (AFRICAN AMERICAN): >60 ML/MIN/1.73 M^2
EST. GFR  (NON AFRICAN AMERICAN): 58 ML/MIN/1.73 M^2
GLUCOSE SERPL-MCNC: 99 MG/DL (ref 70–110)
PHOSPHATE SERPL-MCNC: 2.9 MG/DL (ref 2.7–4.5)
POTASSIUM SERPL-SCNC: 3.4 MMOL/L (ref 3.5–5.1)
PROT SERPL-MCNC: 7.1 G/DL (ref 6–8.4)
SODIUM SERPL-SCNC: 142 MMOL/L (ref 136–145)

## 2022-03-11 PROCEDURE — 36415 COLL VENOUS BLD VENIPUNCTURE: CPT | Mod: PN | Performed by: INTERNAL MEDICINE

## 2022-03-11 PROCEDURE — 84100 ASSAY OF PHOSPHORUS: CPT | Mod: PN | Performed by: INTERNAL MEDICINE

## 2022-03-11 PROCEDURE — 80053 COMPREHEN METABOLIC PANEL: CPT | Mod: PN | Performed by: INTERNAL MEDICINE

## 2023-12-05 ENCOUNTER — OFFICE VISIT (OUTPATIENT)
Dept: GASTROENTEROLOGY | Facility: CLINIC | Age: 68
End: 2023-12-05
Payer: COMMERCIAL

## 2023-12-05 VITALS — BODY MASS INDEX: 23.14 KG/M2 | WEIGHT: 138.88 LBS | HEIGHT: 65 IN

## 2023-12-05 DIAGNOSIS — Z12.11 SCREENING FOR COLON CANCER: Primary | ICD-10-CM

## 2023-12-05 PROCEDURE — 99202 PR OFFICE/OUTPT VISIT, NEW, LEVL II, 15-29 MIN: ICD-10-PCS | Mod: S$GLB,,, | Performed by: NURSE PRACTITIONER

## 2023-12-05 PROCEDURE — 1101F PT FALLS ASSESS-DOCD LE1/YR: CPT | Mod: CPTII,S$GLB,, | Performed by: NURSE PRACTITIONER

## 2023-12-05 PROCEDURE — 1101F PR PT FALLS ASSESS DOC 0-1 FALLS W/OUT INJ PAST YR: ICD-10-PCS | Mod: CPTII,S$GLB,, | Performed by: NURSE PRACTITIONER

## 2023-12-05 PROCEDURE — 3008F BODY MASS INDEX DOCD: CPT | Mod: CPTII,S$GLB,, | Performed by: NURSE PRACTITIONER

## 2023-12-05 PROCEDURE — 3288F PR FALLS RISK ASSESSMENT DOCUMENTED: ICD-10-PCS | Mod: CPTII,S$GLB,, | Performed by: NURSE PRACTITIONER

## 2023-12-05 PROCEDURE — 3008F PR BODY MASS INDEX (BMI) DOCUMENTED: ICD-10-PCS | Mod: CPTII,S$GLB,, | Performed by: NURSE PRACTITIONER

## 2023-12-05 PROCEDURE — 1126F PR PAIN SEVERITY QUANTIFIED, NO PAIN PRESENT: ICD-10-PCS | Mod: CPTII,S$GLB,, | Performed by: NURSE PRACTITIONER

## 2023-12-05 PROCEDURE — 99202 OFFICE O/P NEW SF 15 MIN: CPT | Mod: S$GLB,,, | Performed by: NURSE PRACTITIONER

## 2023-12-05 PROCEDURE — 99999 PR PBB SHADOW E&M-EST. PATIENT-LVL IV: CPT | Mod: PBBFAC,,, | Performed by: NURSE PRACTITIONER

## 2023-12-05 PROCEDURE — 1160F RVW MEDS BY RX/DR IN RCRD: CPT | Mod: CPTII,S$GLB,, | Performed by: NURSE PRACTITIONER

## 2023-12-05 PROCEDURE — 1159F MED LIST DOCD IN RCRD: CPT | Mod: CPTII,S$GLB,, | Performed by: NURSE PRACTITIONER

## 2023-12-05 PROCEDURE — 1126F AMNT PAIN NOTED NONE PRSNT: CPT | Mod: CPTII,S$GLB,, | Performed by: NURSE PRACTITIONER

## 2023-12-05 PROCEDURE — 4010F PR ACE/ARB THEARPY RXD/TAKEN: ICD-10-PCS | Mod: CPTII,S$GLB,, | Performed by: NURSE PRACTITIONER

## 2023-12-05 PROCEDURE — 1160F PR REVIEW ALL MEDS BY PRESCRIBER/CLIN PHARMACIST DOCUMENTED: ICD-10-PCS | Mod: CPTII,S$GLB,, | Performed by: NURSE PRACTITIONER

## 2023-12-05 PROCEDURE — 99999 PR PBB SHADOW E&M-EST. PATIENT-LVL IV: ICD-10-PCS | Mod: PBBFAC,,, | Performed by: NURSE PRACTITIONER

## 2023-12-05 PROCEDURE — 1159F PR MEDICATION LIST DOCUMENTED IN MEDICAL RECORD: ICD-10-PCS | Mod: CPTII,S$GLB,, | Performed by: NURSE PRACTITIONER

## 2023-12-05 PROCEDURE — 3066F NEPHROPATHY DOC TX: CPT | Mod: CPTII,S$GLB,, | Performed by: NURSE PRACTITIONER

## 2023-12-05 PROCEDURE — 4010F ACE/ARB THERAPY RXD/TAKEN: CPT | Mod: CPTII,S$GLB,, | Performed by: NURSE PRACTITIONER

## 2023-12-05 PROCEDURE — 3066F PR DOCUMENTATION OF TREATMENT FOR NEPHROPATHY: ICD-10-PCS | Mod: CPTII,S$GLB,, | Performed by: NURSE PRACTITIONER

## 2023-12-05 PROCEDURE — 3288F FALL RISK ASSESSMENT DOCD: CPT | Mod: CPTII,S$GLB,, | Performed by: NURSE PRACTITIONER

## 2023-12-05 NOTE — PROGRESS NOTES
Subjective:       Patient ID: Leila Stafford is a 68 y.o. female, Body mass index is 23.11 kg/m².    Chief Complaint: Colonoscopy      Patient is new to me. [facility] for [facility] for malignant neoplasm of intestinal tract.    GI Problem  Primary symptoms do not include fever, weight loss, fatigue, abdominal pain, nausea, vomiting, diarrhea, melena, hematemesis, jaundice, hematochezia, dysuria, myalgias, arthralgias or rash.   The illness does not include chills, anorexia, dysphagia, odynophagia, bloating, constipation, tenesmus, back pain or itching. Associated symptoms comments: [facility]. Last c-scope done ~ 10 years ago; unremarkable per pt report. Denies family history of colon cancer. Feeling well, no complaints. Associated medical issues do not include inflammatory bowel disease, GERD, gallstones, liver disease, alcohol abuse, PUD, gastric bypass, bowel resection, irritable bowel syndrome, hemorrhoids or diverticulitis.     Review of Systems   Constitutional:  Negative for appetite change, chills, fatigue, fever, unexpected weight change and weight loss.   HENT:  Negative for trouble swallowing.    Respiratory:  Negative for cough and shortness of breath.    Cardiovascular:  Negative for chest pain.   Gastrointestinal:  Negative for abdominal distention, abdominal pain, anal bleeding, anorexia, bloating, blood in stool, constipation, diarrhea, dysphagia, hematemesis, hematochezia, jaundice, melena, nausea, rectal pain and vomiting.   Genitourinary:  Negative for difficulty urinating and dysuria.   Musculoskeletal:  Negative for arthralgias, back pain, gait problem and myalgias.   Skin:  Negative for itching and rash.   Neurological:  Negative for speech difficulty.   Psychiatric/Behavioral:  Negative for confusion.        Past Medical History:   Diagnosis Date    Cancer 07/2020    breast    Chronic back pain     Chronic kidney disease  (CKD), stage III (moderate)     CKD (chronic kidney disease) stage 3, GFR 30-59 ml/min     Dysphonia     gets botox injection in vocal cords    History of colon polyps     Hypercalcemia     Hyperlipidemia     Hypertension     Magnesium deficiency     Secondary hyperparathyroidism     Vitamin D deficiency       Past Surgical History:   Procedure Laterality Date    AUGMENTATION OF BREAST Bilateral     BREAST BIOPSY Left 02/2020    Papilloma with atypia    BREAST MASS EXCISION Left 05/28/2020    Procedure: EXCISION, MASS, BREAST;  Surgeon: Edilma Chong MD;  Location: [facility];  Service: General;  Laterality: Left;    BREAST RECONSTRUCTION Bilateral 05/28/2020    Procedure: RECONSTRUCTION, BREAST;  Surgeon: Gian Carrera MD;  Location: [facility];  Service: Plastics;  Laterality: Bilateral;    BREAST RECONSTRUCTION Left 07/22/2020    Procedure: RECONSTRUCTION, BREAST;  Surgeon: Gian Carrera MD;  Location: [facility];  Service: Plastics;  Laterality: Left;    BREAST SURGERY      COLONOSCOPY      INSERTION OF BREAST IMPLANT Bilateral 05/28/2020    Procedure: INSERTION, BREAST IMPLANT;  Surgeon: Gian Carrera MD;  Location: [facility];  Service: Plastics;  Laterality: Bilateral;    INSERTION OF BREAST IMPLANT Left 07/22/2020    Procedure: INSERTION, BREAST IMPLANT;  Surgeon: Gian Carrera MD;  Location: [facility];  Service: Plastics;  Laterality: Left;    REMOVAL OF BREAST IMPLANT Bilateral 05/28/2020    Procedure: REMOVAL, IMPLANT, BREAST;  Surgeon: Edilma Chong MD;  Location: [facility];  Service: General;  Laterality: Bilateral;  left and right breast capsule removed and sent to pathology  left taken out was ruptured right implant intact    REMOVAL OF BREAST IMPLANT Left 07/22/2020    Procedure: REMOVAL, IMPLANT, BREAST;  Surgeon: Gian Carrera MD;  Location: [facility];  Service: Plastics;  Laterality: Left;    SENTINEL LYMPH NODE BIOPSY Left 07/22/2020    Procedure: BIOPSY, LYMPH NODE,  SENTINEL;  Surgeon: Edilma Chong MD;  Location: [facility] OR;  Service: General;  Laterality: Left;    SIMPLE MASTECTOMY Left 07/22/2020    Procedure: MASTECTOMY, SIMPLE;  Surgeon: Edilma Chong MD;  Location: [facility] OR;  Service: General;  Laterality: Left;      Family History   Problem Relation Age of Onset    Hypertension Mother     Breast cancer Mother 82    Diabetes Father     Heart disease Father     Hypertension Father     Heart failure Father     Cancer Son         Pancreatic     Colon cancer Neg Hx       Wt Readings from Last 10 Encounters:   12/05/23 63 kg (138 lb 14.2 oz)   11/14/23 61.7 kg (136 lb)   11/06/23 62.6 kg (138 lb)   08/14/23 64 kg (141 lb)   08/02/23 64.9 kg (143 lb)   06/28/23 64.9 kg (143 lb)   04/19/23 64.9 kg (143 lb)   02/02/23 64.4 kg (142 lb)   01/23/23 65.3 kg (144 lb)   11/14/22 65.3 kg (144 lb)     Lab Results   Component Value Date    WBC 7.23 02/01/2021    HGB 13.4 02/01/2021    HCT 44.3 02/01/2021    MCV 89 02/01/2021     02/01/2021     CMP  Sodium   Date Value Ref Range Status   03/11/2022 142 136 - 145 mmol/L Final     Potassium   Date Value Ref Range Status   03/11/2022 3.4 (L) 3.5 - 5.1 mmol/L Final     Chloride   Date Value Ref Range Status   03/11/2022 103 95 - 110 mmol/L Final     CO2   Date Value Ref Range Status   03/11/2022 28 23 - 29 mmol/L Final     Glucose   Date Value Ref Range Status   03/11/2022 99 70 - 110 mg/dL Final     BUN   Date Value Ref Range Status   03/11/2022 13 8 - 23 mg/dL Final     Creatinine   Date Value Ref Range Status   03/11/2022 1.0 0.5 - 1.4 mg/dL Final     Calcium   Date Value Ref Range Status   03/11/2022 9.9 8.7 - 10.5 mg/dL Final     Total Protein   Date Value Ref Range Status   03/11/2022 7.1 6.0 - 8.4 g/dL Final     Albumin   Date Value Ref Range Status   03/11/2022 3.7 3.5 - 5.2 g/dL Final     Total Bilirubin   Date Value Ref Range Status   03/11/2022 0.9 0.1 - 1.0 mg/dL Final     Comment:     For infants and newborns,  interpretation of results should be based  on gestational age, weight and in agreement with clinical  observations.    Premature Infant recommended reference ranges:  Up to 24 hours.............<8.0 mg/dL  Up to 48 hours............<12.0 mg/dL  3-5 days..................<15.0 mg/dL  6-29 days.................<15.0 mg/dL       Alkaline Phosphatase   Date Value Ref Range Status   03/11/2022 62 55 - 135 U/L Final     AST   Date Value Ref Range Status   03/11/2022 20 10 - 40 U/L Final     ALT   Date Value Ref Range Status   03/11/2022 17 10 - 44 U/L Final     Anion Gap   Date Value Ref Range Status   03/11/2022 11 8 - 16 mmol/L Final     eGFR if    Date Value Ref Range Status   03/11/2022 >60 >60 mL/min/1.73 m^2 Final     eGFR if non    Date Value Ref Range Status   03/11/2022 58 (A) >60 mL/min/1.73 m^2 Final     Comment:     Calculation used to obtain the estimated glomerular filtration  rate (eGFR) is the CKD-EPI equation.                 Reviewed prior medical records including radiology report of CT of abdomen and pelvis 11/2/22 & endoscopy history (see surgical history).     Objective:      Physical Exam  Constitutional:       General: She is not in acute distress.     Appearance: She is well-developed.   HENT:      Head: Normocephalic.      Right Ear: Hearing normal.      Left Ear: Hearing normal.      Nose: Nose normal.      Mouth/Throat:      Mouth: No oral lesions.      Pharynx: Uvula midline.   Eyes:      General: Lids are normal.      Conjunctiva/sclera: Conjunctivae normal.      Pupils: Pupils are equal, round, and reactive to light.   Neck:      Trachea: Trachea normal.   Cardiovascular:      Rate and Rhythm: Normal rate and regular rhythm.      Heart sounds: Normal heart sounds. No murmur heard.  Pulmonary:      Effort: Pulmonary effort is normal. No respiratory distress.      Breath sounds: Normal breath sounds. No stridor. No wheezing.   Abdominal:      General: Bowel  sounds are normal. There is no distension.      Palpations: Abdomen is soft. There is no mass.      Tenderness: There is no abdominal tenderness. There is no guarding or rebound.   Musculoskeletal:         General: Normal range of motion.      Cervical back: Normal range of motion.   Skin:     General: Skin is warm and dry.      Findings: No rash.      Comments: Non jaundiced   Neurological:      Mental Status: She is alert and oriented to person, place, and time.   Psychiatric:         Speech: Speech normal.         Behavior: Behavior normal. Behavior is cooperative.           Assessment:       1. Screening for colon cancer           Plan:   All diagnoses and orders for this visit:    Screening for colon cancer   - Schedule Colonoscopy     If no improvement in symptoms or symptoms worsen, call/follow-up at clinic or go to ER

## 2023-12-13 ENCOUNTER — ANESTHESIA EVENT (OUTPATIENT)
Dept: ENDOSCOPY | Facility: HOSPITAL | Age: 68
End: 2023-12-13
Payer: COMMERCIAL

## 2023-12-13 ENCOUNTER — HOSPITAL ENCOUNTER (OUTPATIENT)
Facility: HOSPITAL | Age: 68
Discharge: HOME | End: 2023-12-13
Attending: INTERNAL MEDICINE | Admitting: INTERNAL MEDICINE
Payer: COMMERCIAL

## 2023-12-13 ENCOUNTER — ANESTHESIA (OUTPATIENT)
Dept: ENDOSCOPY | Facility: HOSPITAL | Age: 68
End: 2023-12-13
Payer: COMMERCIAL

## 2023-12-13 VITALS
WEIGHT: 138 LBS | HEART RATE: 60 BPM | TEMPERATURE: 98 F | HEIGHT: 65 IN | BODY MASS INDEX: 22.99 KG/M2 | DIASTOLIC BLOOD PRESSURE: 62 MMHG | SYSTOLIC BLOOD PRESSURE: 123 MMHG | RESPIRATION RATE: 16 BRPM | OXYGEN SATURATION: 97 %

## 2023-12-13 DIAGNOSIS — Z12.11 SCREEN FOR COLON CANCER: ICD-10-CM

## 2023-12-13 PROCEDURE — 25000003 PHARM REV CODE 250: Mod: PO | Performed by: NURSE ANESTHETIST, CERTIFIED REGISTERED

## 2023-12-13 PROCEDURE — D9220A PRA ANESTHESIA: ICD-10-PCS | Mod: ANES,,, | Performed by: ANESTHESIOLOGY

## 2023-12-13 PROCEDURE — G0121 COLON CA SCRN NOT HI RSK IND: ICD-10-PCS | Mod: ,,, | Performed by: INTERNAL MEDICINE

## 2023-12-13 PROCEDURE — G0121 COLON CA SCRN NOT HI RSK IND: HCPCS | Mod: PO | Performed by: INTERNAL MEDICINE

## 2023-12-13 PROCEDURE — D9220A PRA ANESTHESIA: Mod: ANES,,, | Performed by: ANESTHESIOLOGY

## 2023-12-13 PROCEDURE — G0121 COLON CA SCRN NOT HI RSK IND: HCPCS | Mod: ,,, | Performed by: INTERNAL MEDICINE

## 2023-12-13 PROCEDURE — D9220A PRA ANESTHESIA: Mod: CRNA,,, | Performed by: NURSE ANESTHETIST, CERTIFIED REGISTERED

## 2023-12-13 PROCEDURE — D9220A PRA ANESTHESIA: ICD-10-PCS | Mod: CRNA,,, | Performed by: NURSE ANESTHETIST, CERTIFIED REGISTERED

## 2023-12-13 PROCEDURE — 63600175 PHARM REV CODE 636 W HCPCS: Mod: PO | Performed by: NURSE ANESTHETIST, CERTIFIED REGISTERED

## 2023-12-13 PROCEDURE — 37000009 HC ANESTHESIA EA ADD 15 MINS: Mod: PO | Performed by: INTERNAL MEDICINE

## 2023-12-13 PROCEDURE — 37000008 HC ANESTHESIA 1ST 15 MINUTES: Mod: PO | Performed by: INTERNAL MEDICINE

## 2023-12-13 PROCEDURE — 63600175 PHARM REV CODE 636 W HCPCS: Mod: PO | Performed by: INTERNAL MEDICINE

## 2023-12-13 RX ORDER — SODIUM CHLORIDE, SODIUM LACTATE, POTASSIUM CHLORIDE, CALCIUM CHLORIDE 600; 310; 30; 20 MG/100ML; MG/100ML; MG/100ML; MG/100ML
INJECTION, SOLUTION INTRAVENOUS CONTINUOUS
Status: DISCONTINUED | OUTPATIENT
Start: 2023-12-13 | End: 2023-12-13 | Stop reason: HOSPADM

## 2023-12-13 RX ORDER — LIDOCAINE HYDROCHLORIDE 20 MG/ML
INJECTION INTRAVENOUS
Status: DISCONTINUED | OUTPATIENT
Start: 2023-12-13 | End: 2023-12-13

## 2023-12-13 RX ORDER — SODIUM CHLORIDE 0.9 % (FLUSH) 0.9 %
10 SYRINGE (ML) INJECTION
Status: DISCONTINUED | OUTPATIENT
Start: 2023-12-13 | End: 2023-12-13 | Stop reason: HOSPADM

## 2023-12-13 RX ORDER — PROPOFOL 10 MG/ML
VIAL (ML) INTRAVENOUS
Status: DISCONTINUED | OUTPATIENT
Start: 2023-12-13 | End: 2023-12-13

## 2023-12-13 RX ADMIN — PROPOFOL 50 MG: 10 INJECTION, EMULSION INTRAVENOUS at 07:12

## 2023-12-13 RX ADMIN — SODIUM CHLORIDE, POTASSIUM CHLORIDE, SODIUM LACTATE AND CALCIUM CHLORIDE: 600; 310; 30; 20 INJECTION, SOLUTION INTRAVENOUS at 07:12

## 2023-12-13 RX ADMIN — PROPOFOL 125 MG: 10 INJECTION, EMULSION INTRAVENOUS at 07:12

## 2023-12-13 RX ADMIN — LIDOCAINE HYDROCHLORIDE 100 MG: 20 INJECTION INTRAVENOUS at 07:12

## 2023-12-13 NOTE — DISCHARGE SUMMARY
[facility] - [facility]  Discharge Note  Short Stay  Discharge Note  Short Stay      SUMMARY     Admit Date: 12/13/2023    Attending Physician: Eric Rice MD     Discharge Physician: Eric Rice MD    Discharge Date: 12/13/2023 7:44 AM    Final Diagnosis: Colon cancer screening [Z12.11]    Disposition: HOME OR SELF CARE    Patient Instructions:   Current Discharge Medication List        CONTINUE these medications which have NOT CHANGED    Details   ALPRAZolam (XANAX) 1 MG tablet Take 1 mg by mouth daily as needed.   Refills: 0      amLODIPine (NORVASC) 5 MG tablet Take 1 tablet (5 mg total) by mouth once daily.  Qty: 30 tablet, Refills: 11    Comments: .      biotin 1 mg Cap Take by mouth.      coenzyme Q10 10 mg capsule Take by mouth.      cyanocobalamin-salcaprozat sod 1,000-100 mcg-mg Tab TAKE  BY MOUTH EVERY DAY      febuxostat (ULORIC) 40 mg Tab TAKE 1 TABLET BY MOUTH ONCE EVERY DAY  Qty: 90 tablet, Refills: 3      HYDROcodone-acetaminophen (NORCO)  mg per tablet       letrozole (FEMARA) 2.5 mg Tab Take 2.5 mg by mouth once daily.      magnesium oxide (MAG-OX) 400 mg (241.3 mg magnesium) tablet Take 400 mg by mouth once daily.      metoprolol tartrate (LOPRESSOR) 100 MG tablet TAKE 1 TABLET BY MOUTH TWICE DAILY FOR BLOOD PRESSURE  Qty: 180 tablet, Refills: 1    Comments: .  Associated Diagnoses: Essential hypertension, benign      olmesartan (BENICAR) 20 MG tablet Take 20 mg by mouth every evening.      sodium chloride 0.9% SolP 100 mL with zoledronic acid 4 mg/5 mL Soln Inject into the vein.      traMADoL (ULTRAM-ER) 300 MG Tb24 Take 1 tablet (300 mg total) by mouth once daily.  Qty: 30 tablet, Refills: 5    Comments: Quantity prescribed more than 7 day supply? Yes, quantity medically necessary      zinc sulfate 25 mg zinc (110 mg) Tab Take 110 mg by mouth once daily.      acyclovir (ZOVIRAX) 800 MG Tab       multivitamin capsule Take 1 capsule by mouth every other day.      zoledronic  acid (ZOMETA) 4 mg/5 mL injection INJECT  INTRAVENOUS EVERY SIX MONTHS             Discharge Procedure Orders (must include Diet, Follow-up, Activity)    Follow Up:  Follow up with PCP as previously scheduled  Resume routine diet.  Activity as tolerated.    No driving day of procedure.

## 2023-12-13 NOTE — TRANSFER OF CARE
"Anesthesia Transfer of Care Note    Patient: Leila Stafford    Procedure(s) Performed: Procedure(s) (LRB):  COLONOSCOPY (N/A)    Patient location: PACU    Anesthesia Type: general    Transport from OR: Transported from OR on room air with adequate spontaneous ventilation    Post pain: adequate analgesia    Post assessment: no apparent anesthetic complications and tolerated procedure well    Post vital signs: stable    Level of consciousness: sedated and awake    Nausea/Vomiting: no nausea/vomiting    Complications: none    Transfer of care protocol was followed      Last vitals: Visit Vitals  BP (!) 85/49   Pulse 72   Temp 36.7 °C (98 °F) (Skin)   Resp 18   Ht 5' 5" (1.651 m)   Wt 62.6 kg (138 lb)   SpO2 99%   Breastfeeding No   BMI 22.96 kg/m²     "

## 2023-12-13 NOTE — ANESTHESIA POSTPROCEDURE EVALUATION
Anesthesia Post Evaluation    Patient: Leila Stafford    Procedure(s) Performed: Procedure(s) (LRB):  COLONOSCOPY (N/A)    Final Anesthesia Type: general      Patient location during evaluation: PACU  Patient participation: Yes- Able to Participate  Level of consciousness: awake and alert and oriented  Post-procedure vital signs: reviewed and stable  Pain management: adequate  Airway patency: patent    PONV status at discharge: No PONV  Anesthetic complications: no      Cardiovascular status: blood pressure returned to baseline and stable  Respiratory status: unassisted and spontaneous ventilation  Hydration status: euvolemic  Follow-up not needed.              Vitals Value Taken Time   /62 12/13/23 0821   Temp 36.7 °C (98 °F) 12/13/23 0744   Pulse 60 12/13/23 0821   Resp 16 12/13/23 0821   SpO2 97 % 12/13/23 0821         Event Time   Out of Recovery 08:22:20         Pain/Vani Score: Pain Rating Post Med Admin: 0 (12/13/2023  7:44 AM)  Vani Score: 10 (12/13/2023  8:21 AM)

## 2023-12-13 NOTE — ANESTHESIA PREPROCEDURE EVALUATION
12/13/2023  Leila Stafford is a 68 y.o., female.      Pre-op Assessment    I have reviewed the Patient Summary Reports.     I have reviewed the Nursing Notes. I have reviewed the NPO Status.   I have reviewed the Medications.     Review of Systems  Anesthesia Hx:  No problems with previous Anesthesia                Social:  Non-Smoker       Hematology/Oncology:                        --  Cancer in past history:       Breast    left          Cardiovascular:     Hypertension, well controlled           hyperlipidemia                             Pulmonary:  Pulmonary Normal                       Renal/:  Chronic Renal Disease (stage 3), CKD                Musculoskeletal:         Spine Disorders:  Chronic Pain           Neurological:  Neurology Normal                                      Endocrine:  Endocrine Normal                Physical Exam  General: Well nourished, Cooperative, Alert and Oriented    Airway:  Mallampati: II   Mouth Opening: Normal  TM Distance: Normal  Neck ROM: Normal ROM    Anesthesia Plan  Type of Anesthesia, risks & benefits discussed:    Anesthesia Type: Gen ETT, Gen Supraglottic Airway, Gen Natural Airway, MAC  Intra-op Monitoring Plan: Standard ASA Monitors  Post Op Pain Control Plan: multimodal analgesia  Induction:  IV  Airway Plan: Direct, Video and Fiberoptic, Post-Induction  Informed Consent: Informed consent signed with the Patient and all parties understand the risks and agree with anesthesia plan.  All questions answered.   ASA Score: 3    Ready For Surgery From Anesthesia Perspective.   .

## 2023-12-13 NOTE — H&P
History & Physical - Short Stay  Gastroenterology      SUBJECTIVE:     Procedure: Colonoscopy    Chief Complaint/Indication for Procedure: Screening    PTA Medications   Medication Sig    amLODIPine (NORVASC) 5 MG tablet Take 1 tablet (5 mg total) by mouth once daily.    biotin 1 mg Cap Take by mouth.    coenzyme Q10 10 mg capsule Take by mouth.    cyanocobalamin-salcaprozat sod 1,000-100 mcg-mg Tab TAKE  BY MOUTH EVERY DAY    febuxostat (ULORIC) 40 mg Tab TAKE 1 TABLET BY MOUTH ONCE EVERY DAY (Patient taking differently: Take 40 mg by mouth once daily.)    letrozole (FEMARA) 2.5 mg Tab Take 2.5 mg by mouth once daily.    magnesium oxide (MAG-OX) 400 mg (241.3 mg magnesium) tablet Take 400 mg by mouth once daily.    metoprolol tartrate (LOPRESSOR) 100 MG tablet TAKE 1 TABLET BY MOUTH TWICE DAILY FOR BLOOD PRESSURE    olmesartan (BENICAR) 20 MG tablet Take 20 mg by mouth every evening.    traMADoL (ULTRAM-ER) 300 MG Tb24 Take 1 tablet (300 mg total) by mouth once daily.    zinc sulfate 25 mg zinc (110 mg) Tab Take 110 mg by mouth once daily.    acyclovir (ZOVIRAX) 800 MG Tab     ALPRAZolam (XANAX) 1 MG tablet Take 1 mg by mouth daily as needed.     HYDROcodone-acetaminophen (NORCO)  mg per tablet     multivitamin capsule Take 1 capsule by mouth every other day.    sodium chloride 0.9% SolP 100 mL with zoledronic acid 4 mg/5 mL Soln Inject into the vein.    zoledronic acid (ZOMETA) 4 mg/5 mL injection INJECT  INTRAVENOUS EVERY SIX MONTHS       Review of patient's allergies indicates:   Allergen Reactions    Olmesartan      Felt fuzzy  (cog issue)        Past Medical History:   Diagnosis Date    Cancer 07/2020    breast    Chronic back pain     Chronic kidney disease (CKD), stage III (moderate)     CKD (chronic kidney disease) stage 3, GFR 30-59 ml/min     Dysphonia     gets botox injection in vocal cords    History of colon polyps     Hypercalcemia     Hyperlipidemia     Hypertension     Magnesium deficiency      Secondary hyperparathyroidism     Vitamin D deficiency      Past Surgical History:   Procedure Laterality Date    AUGMENTATION OF BREAST Bilateral     BREAST BIOPSY Left 02/2020    Papilloma with atypia    BREAST MASS EXCISION Left 05/28/2020    Procedure: EXCISION, MASS, BREAST;  Surgeon: Edilma Chong MD;  Location: [facility];  Service: General;  Laterality: Left;    BREAST RECONSTRUCTION Bilateral 05/28/2020    Procedure: RECONSTRUCTION, BREAST;  Surgeon: Gian Carrera MD;  Location: [facility];  Service: Plastics;  Laterality: Bilateral;    BREAST RECONSTRUCTION Left 07/22/2020    Procedure: RECONSTRUCTION, BREAST;  Surgeon: Gian Carrera MD;  Location: [facility];  Service: Plastics;  Laterality: Left;    BREAST SURGERY      COLONOSCOPY      INSERTION OF BREAST IMPLANT Bilateral 05/28/2020    Procedure: INSERTION, BREAST IMPLANT;  Surgeon: Gian Carrera MD;  Location: [facility];  Service: Plastics;  Laterality: Bilateral;    INSERTION OF BREAST IMPLANT Left 07/22/2020    Procedure: INSERTION, BREAST IMPLANT;  Surgeon: Gian Carrera MD;  Location: [facility] OR;  Service: Plastics;  Laterality: Left;    REMOVAL OF BREAST IMPLANT Bilateral 05/28/2020    Procedure: REMOVAL, IMPLANT, BREAST;  Surgeon: Edilma Chong MD;  Location: [facility];  Service: General;  Laterality: Bilateral;  left and right breast capsule removed and sent to pathology  left taken out was ruptured right implant intact    REMOVAL OF BREAST IMPLANT Left 07/22/2020    Procedure: REMOVAL, IMPLANT, BREAST;  Surgeon: Gian Carrera MD;  Location: [facility];  Service: Plastics;  Laterality: Left;    SENTINEL LYMPH NODE BIOPSY Left 07/22/2020    Procedure: BIOPSY, LYMPH NODE, SENTINEL;  Surgeon: Edilma Chong MD;  Location: [facility] OR;  Service: General;  Laterality: Left;    SIMPLE MASTECTOMY Left 07/22/2020    Procedure: MASTECTOMY, SIMPLE;  Surgeon: Edilma Chong MD;  Location: [facility] OR;  Service: General;   Laterality: Left;     Family History   Problem Relation Age of Onset    Hypertension Mother     Breast cancer Mother 82    Diabetes Father     Heart disease Father     Hypertension Father     Heart failure Father     Cancer Son         Pancreatic     Colon cancer Neg Hx      Social History     Tobacco Use    Smoking status: Never    Smokeless tobacco: Never   Substance Use Topics    Alcohol use: Not Currently    Drug use: No         OBJECTIVE:     Vital Signs (Most Recent)       Physical Exam                                                        GENERAL:  Comfortable, in no acute distress.                                 HEENT EXAM:  Nonicteric.  No adenopathy.  Oropharynx is clear.               NECK:  Supple.                                                               LUNGS:  Clear.                                                               CARDIAC:  Regular rate and rhythm.  S1, S2.  No murmur.                      ABDOMEN:  Soft, positive bowel sounds, nontender.  No hepatosplenomegaly or masses.  No rebound or guarding.                                             EXTREMITIES:  No edema.     MENTAL STATUS:  Normal, alert and oriented.      ASSESSMENT/PLAN:     Assessment: Colorectal cancer screening    Plan: Colonoscopy    Anesthesia Plan: General    ASA Grade: ASA 2 - Patient with mild systemic disease with no functional limitations    MALLAMPATI SCORE:  I (soft palate, uvula, fauces, and tonsillar pillars visible)

## 2023-12-13 NOTE — [employer] PATIENT INSTRUCTIONS
Discharge Summary/Instructions after an Endoscopic Procedure  Patient Name: Leila Stafford  Patient MRN: [id number]  Patient YOB: 1955  Wednesday, December 13, 2023  Eric Rice MD  Dear patient,  As a result of recent federal legislation (The Federal Cures Act), you may   receive lab or pathology results from your procedure in your [facility]   account before your physician is able to contact you. Your physician or   their representative will relay the results to you with their   recommendations at their soonest availability.  Thank you,  RESTRICTIONS:  During your procedure today, you received medications for sedation.  These   medications may affect your judgment, balance and coordination.  Therefore,   for 24 hours, you have the following restrictions:   - DO NOT drive a car, operate machinery, make legal/financial decisions,   sign important papers or drink alcohol.    ACTIVITY:  Today: no heavy lifting, straining or running due to procedural   sedation/anesthesia.  The following day: return to full activity including work.  DIET:  Eat and drink normally unless instructed otherwise.     TREATMENT FOR COMMON SIDE EFFECTS:  - Mild abdominal pain, nausea, belching, bloating or excessive gas:  rest,   eat lightly and use a heating pad.  - Sore Throat: treat with throat lozenges and/or gargle with warm salt   water.  - Because air was used during the procedure, expelling large amounts of air   from your rectum or belching is normal.  - If a bowel prep was taken, you may not have a bowel movement for 1-3 days.    This is normal.  SYMPTOMS TO WATCH FOR AND REPORT TO YOUR PHYSICIAN:  1. Abdominal pain or bloating, other than gas cramps.  2. Chest pain.  3. Back pain.  4. Signs of infection such as: chills or fever occurring within 24 hours   after the procedure.  5. Rectal bleeding, which would show as bright red, maroon, or black stools.   (A tablespoon of blood from the rectum is not serious,  especially if   hemorrhoids are present.)  6. Vomiting.  7. Weakness or dizziness.  GO DIRECTLY TO THE NEAREST EMERGENCY ROOM IF YOU HAVE ANY OF THE FOLLOWING:      Difficulty breathing              Chills and/or fever over 101 F   Persistent vomiting and/or vomiting blood   Severe abdominal pain   Severe chest pain   Black, tarry stools   Bleeding- more than one tablespoon   Any other symptom or condition that you feel may need urgent attention  Your doctor recommends these additional instructions:  If any biopsies were taken, your doctors clinic will contact you in 1 to 2   weeks with any results.  Your physician has indicated that a repeat colonoscopy is not recommended   for screening purposes.   You are being discharged to home.  For questions, problems or results please call your physician - Eric Rice MD at Work:  [phone].  EMERGENCY PHONE NUMBER: [phone], LAB RESULTS: [phone]  IF A COMPLICATION OR EMERGENCY SITUATION ARISES AND YOU ARE UNABLE TO REACH   YOUR PHYSICIAN - GO DIRECTLY TO THE EMERGENCY ROOM.  ___________________________________________  Nurse Signature  ___________________________________________  Patient/Designated Responsible Party Signature  Eric Rice MD  12/13/2023 7:43:25 AM  This report has been verified and signed electronically.  Dear patient,  As a result of recent federal legislation (The Federal Cures Act), you may   receive lab or pathology results from your procedure in your [facility]   account before your physician is able to contact you. Your physician or   their representative will relay the results to you with their   recommendations at their soonest availability.  Thank you.  [employer]

## 2024-02-23 PROBLEM — R93.3 ABNORMAL CT SCAN, COLON: Status: ACTIVE | Noted: 2024-02-23

## 2024-02-23 PROBLEM — R10.30 LOWER ABDOMINAL PAIN: Status: ACTIVE | Noted: 2024-02-23

## 2024-02-23 PROBLEM — K62.5 RECTAL BLEED: Status: ACTIVE | Noted: 2024-02-23

## 2024-02-23 PROBLEM — N18.30 STAGE 3 CHRONIC KIDNEY DISEASE: Status: ACTIVE | Noted: 2024-02-23

## 2024-02-23 PROBLEM — K52.9 COLITIS: Status: ACTIVE | Noted: 2024-02-23

## 2024-02-23 PROBLEM — N17.9 AKI (ACUTE KIDNEY INJURY): Status: ACTIVE | Noted: 2024-02-23

## 2024-02-24 PROBLEM — K55.039 ACUTE ISCHEMIC COLITIS: Status: ACTIVE | Noted: 2024-02-24

## 2024-02-27 ENCOUNTER — TELEPHONE (OUTPATIENT)
Dept: GASTROENTEROLOGY | Facility: CLINIC | Age: 69
End: 2024-02-27
Payer: COMMERCIAL

## 2024-02-27 NOTE — TELEPHONE ENCOUNTER
----- Message from Jessa Dunn sent at 2/27/2024 10:00 AM CST -----  Contact: [facility]  Type:  Sooner Appointment Request    Caller is requesting a sooner appointment.  Caller declined first available appointment listed below.  Caller will not accept being placed on the waitlist and is requesting a message be sent to doctor.    Name of Caller:  [facility]  When is the first available appointment?  04/01  Symptoms:  needs 1 week for rectal bleed  Would the patient rather a call back or a response via [facility]? call  Best Call Back Number:  [phone]  Additional Information:  Please call the pt. Thanks!

## 2024-03-06 ENCOUNTER — OFFICE VISIT (OUTPATIENT)
Dept: GASTROENTEROLOGY | Facility: CLINIC | Age: 69
End: 2024-03-06
Payer: COMMERCIAL

## 2024-03-06 VITALS — WEIGHT: 138 LBS | HEIGHT: 65 IN | BODY MASS INDEX: 22.99 KG/M2

## 2024-03-06 DIAGNOSIS — I70.90 ATHEROSCLEROSIS: ICD-10-CM

## 2024-03-06 DIAGNOSIS — Z87.19 HISTORY OF ISCHEMIC COLITIS: ICD-10-CM

## 2024-03-06 DIAGNOSIS — K76.0 HEPATIC STEATOSIS: ICD-10-CM

## 2024-03-06 DIAGNOSIS — Z09 HOSPITAL DISCHARGE FOLLOW-UP: Primary | ICD-10-CM

## 2024-03-06 DIAGNOSIS — R79.89 ELEVATED LFTS: ICD-10-CM

## 2024-03-06 PROCEDURE — 3008F BODY MASS INDEX DOCD: CPT | Mod: CPTII,S$GLB,, | Performed by: NURSE PRACTITIONER

## 2024-03-06 PROCEDURE — 4010F ACE/ARB THERAPY RXD/TAKEN: CPT | Mod: CPTII,S$GLB,, | Performed by: NURSE PRACTITIONER

## 2024-03-06 PROCEDURE — 99213 OFFICE O/P EST LOW 20 MIN: CPT | Mod: S$GLB,,, | Performed by: NURSE PRACTITIONER

## 2024-03-06 PROCEDURE — 99999 PR PBB SHADOW E&M-EST. PATIENT-LVL IV: CPT | Mod: PBBFAC,,, | Performed by: NURSE PRACTITIONER

## 2024-03-06 PROCEDURE — 1159F MED LIST DOCD IN RCRD: CPT | Mod: CPTII,S$GLB,, | Performed by: NURSE PRACTITIONER

## 2024-03-06 PROCEDURE — 1101F PT FALLS ASSESS-DOCD LE1/YR: CPT | Mod: CPTII,S$GLB,, | Performed by: NURSE PRACTITIONER

## 2024-03-06 PROCEDURE — 3288F FALL RISK ASSESSMENT DOCD: CPT | Mod: CPTII,S$GLB,, | Performed by: NURSE PRACTITIONER

## 2024-03-06 PROCEDURE — 1126F AMNT PAIN NOTED NONE PRSNT: CPT | Mod: CPTII,S$GLB,, | Performed by: NURSE PRACTITIONER

## 2024-03-06 PROCEDURE — 1160F RVW MEDS BY RX/DR IN RCRD: CPT | Mod: CPTII,S$GLB,, | Performed by: NURSE PRACTITIONER

## 2024-03-06 NOTE — PROGRESS NOTES
"Subjective:       Patient ID: Leila Stafford is a 68 y.o. female Body mass index is 22.97 kg/m².    Chief Complaint: Rectal Bleeding and er follow up    This patient is new to me.  Established patient of Dr. Hernandez, Dr. Rice (primary GI physician), & JUAN JOSE Bailey NP.    Reviewed hospital discharge summary: "Admit Date: 2/23/2024  Discharge Date and Time:  02/25/2024 8:25 AM  Attending Physician: Marguerite Llanes MD   Reason for Admission: rectal bleeding  Procedures Performed: Procedure(s) (LRB):  COLONOSCOPY (N/A)  Hospital Course (synopsis of major diagnoses, care, treatment, and services provided during the course of the hospital stay):   This is a 68-year-old female that presented to the   Emergency Department a few hours ago with the onset of bright blood per rectum.   The patient states that yesterday, she developed some abdominal pain in the   suprapubic region that continued all day.  She had been constipated for several   days, took several laxatives and was able to have a substantial bowel movement   around 6:00 p.m. that was associated with diaphoresis.  After that, she had   several liquid bowel movements and then at 3:00 a.m., she had 2 bowel movements   that consisted of just bright red blood with some mucus.  Her rectal bleeding stopped.  SHe required no blood transfusions.  Her diet was advanced which she tolerated.  She was maintained on iv zosyn.  She was switched to po augmentin.  She has some GI upset with it in the past, but she prefers to take this instead of cipro which she has much more intolerance to it.  Colonoscopy was performed:   The anus is normal.                          - The rectum and recto-sigmoid colon are normal.                          - Diverticulosis in the distal sigmoid colon.                          - Congested, erythematous and ulcerated mucosa in                          the distal sigmoid colon. Biopsied.                          - Left-sided ischemic " "colitis. Biopsied."    GI Problem  Primary symptoms do not include fever, weight loss, fatigue, abdominal pain, nausea, vomiting, diarrhea, melena, hematemesis, jaundice or hematochezia (none since hospitalization). The problem has been resolved.   The illness does not include chills, dysphagia, odynophagia or constipation. Associated symptoms comments: Bowel movements are once every other day of formed stool; taking OTC probiotic once daily; finished out augmentin. Associated medical issues do not include GERD.     Review of Systems   Constitutional:  Negative for appetite change, chills, fatigue, fever and weight loss.   HENT:  Negative for trouble swallowing.    Respiratory:  Negative for shortness of breath.    Cardiovascular:  Negative for chest pain.   Gastrointestinal:  Negative for abdominal pain, anal bleeding, blood in stool, constipation, diarrhea, dysphagia, hematemesis, hematochezia (none since hospitalization), jaundice, melena, nausea, rectal pain and vomiting.   Neurological:  Negative for weakness.       No LMP recorded. Patient is postmenopausal.  Past Medical History:   Diagnosis Date    Cancer 07/2020    breast    Chronic back pain     Chronic kidney disease (CKD), stage III (moderate)     CKD (chronic kidney disease) stage 3, GFR 30-59 ml/min     Dysphonia     gets botox injection in vocal cords    Hepatic steatosis     History of colon polyps     Hypercalcemia     Hyperlipidemia     Hypertension     Ischemic colitis 02/2024    Magnesium deficiency     Secondary hyperparathyroidism     Vitamin D deficiency      Past Surgical History:   Procedure Laterality Date    AUGMENTATION OF BREAST Bilateral     BREAST BIOPSY Left 02/2020    Papilloma with atypia    BREAST MASS EXCISION Left 05/28/2020    Procedure: EXCISION, MASS, BREAST;  Surgeon: Edilma Chong MD;  Location: [facility];  Service: General;  Laterality: Left;    BREAST RECONSTRUCTION Bilateral 05/28/2020    Procedure: " RECONSTRUCTION, BREAST;  Surgeon: Gian Carrera MD;  Location: [facility];  Service: Plastics;  Laterality: Bilateral;    BREAST RECONSTRUCTION Left 07/22/2020    Procedure: RECONSTRUCTION, BREAST;  Surgeon: Gian Carrera MD;  Location: [facility] OR;  Service: Plastics;  Laterality: Left;    BREAST SURGERY      COLONOSCOPY      COLONOSCOPY N/A 12/13/2023    Procedure: COLONOSCOPY;  Surgeon: Eric Rice MD;  Location: [facility] ENDO;  Service: Gastroenterology;  Laterality: N/A;    COLONOSCOPY N/A 2/23/2024    Procedure: COLONOSCOPY;  Surgeon: Elmer Hernandez Jr., MD;  Location: [facility];  Service: Endoscopy;  Laterality: N/A;  Tortuous colon, unable to reach cecum.    INSERTION OF BREAST IMPLANT Bilateral 05/28/2020    Procedure: INSERTION, BREAST IMPLANT;  Surgeon: Gian Carrera MD;  Location: [facility];  Service: Plastics;  Laterality: Bilateral;    INSERTION OF BREAST IMPLANT Left 07/22/2020    Procedure: INSERTION, BREAST IMPLANT;  Surgeon: Gian Carrera MD;  Location: [facility];  Service: Plastics;  Laterality: Left;    REMOVAL OF BREAST IMPLANT Bilateral 05/28/2020    Procedure: REMOVAL, IMPLANT, BREAST;  Surgeon: Edilma Chong MD;  Location: [facility];  Service: General;  Laterality: Bilateral;  left and right breast capsule removed and sent to pathology  left taken out was ruptured right implant intact    REMOVAL OF BREAST IMPLANT Left 07/22/2020    Procedure: REMOVAL, IMPLANT, BREAST;  Surgeon: Gian Carrera MD;  Location: [facility] OR;  Service: Plastics;  Laterality: Left;    SENTINEL LYMPH NODE BIOPSY Left 07/22/2020    Procedure: BIOPSY, LYMPH NODE, SENTINEL;  Surgeon: Edilma Chong MD;  Location: [facility] OR;  Service: General;  Laterality: Left;    SIMPLE MASTECTOMY Left 07/22/2020    Procedure: MASTECTOMY, SIMPLE;  Surgeon: Edilma Chong MD;  Location: [facility] OR;  Service: General;  Laterality: Left;     Family History   Problem Relation Age of Onset     Hypertension Mother     Breast cancer Mother 82    Diabetes Father     Heart disease Father     Hypertension Father     Heart failure Father     Cancer Son         Pancreatic     Colon cancer Neg Hx     Crohn's disease Neg Hx     Ulcerative colitis Neg Hx      Social History     Tobacco Use    Smoking status: Never    Smokeless tobacco: Never   Substance Use Topics    Alcohol use: Not Currently    Drug use: No     Wt Readings from Last 10 Encounters:   03/06/24 62.6 kg (138 lb 0.1 oz)   03/05/24 62.1 kg (137 lb)   02/23/24 63.5 kg (140 lb)   02/21/24 62.6 kg (138 lb)   12/11/23 62.6 kg (138 lb)   12/05/23 63 kg (138 lb 14.2 oz)   11/14/23 61.7 kg (136 lb)   11/06/23 62.6 kg (138 lb)   08/14/23 64 kg (141 lb)   08/02/23 64.9 kg (143 lb)     Lab Results   Component Value Date    WBC 8.95 02/25/2024    HGB 12.8 02/25/2024    HCT 38.0 02/25/2024    MCV 97 02/25/2024     02/25/2024     CMP  Sodium   Date Value Ref Range Status   02/24/2024 136 136 - 145 mmol/L Final     Potassium   Date Value Ref Range Status   02/24/2024 3.9 3.5 - 5.1 mmol/L Final     Comment:     Anion Gap reference range revised on 4/28/2023     Chloride   Date Value Ref Range Status   02/24/2024 103 95 - 110 mmol/L Final     CO2   Date Value Ref Range Status   02/24/2024 27 22 - 31 mmol/L Final     Glucose   Date Value Ref Range Status   02/24/2024 102 70 - 110 mg/dL Final     Comment:     The ADA recommends the following guidelines for fasting glucose:    Normal:       less than 100 mg/dL    Prediabetes:  100 mg/dL to 125 mg/dL    Diabetes:     126 mg/dL or higher       BUN   Date Value Ref Range Status   02/24/2024 19 (H) 7 - 18 mg/dL Final     Creatinine   Date Value Ref Range Status   02/24/2024 1.19 0.50 - 1.40 mg/dL Final     Calcium   Date Value Ref Range Status   02/24/2024 8.6 8.4 - 10.2 mg/dL Final     Total Protein   Date Value Ref Range Status   02/24/2024 6.0 6.0 - 8.4 g/dL Final     Albumin   Date Value Ref Range Status    02/24/2024 3.3 (L) 3.5 - 5.2 g/dL Final     Total Bilirubin   Date Value Ref Range Status   02/24/2024 1.4 (H) 0.2 - 1.3 mg/dL Final     Alkaline Phosphatase   Date Value Ref Range Status   02/24/2024 57 38 - 145 U/L Final     AST   Date Value Ref Range Status   02/24/2024 46 (H) 14 - 36 U/L Final     ALT   Date Value Ref Range Status   02/24/2024 36 (H) 0 - 35 U/L Final     Anion Gap   Date Value Ref Range Status   02/24/2024 6 5 - 12 mmol/L Final     Comment:     Anion Gap reference range revised on 4/28/2023     eGFR if    Date Value Ref Range Status   03/11/2022 >60 >60 mL/min/1.73 m^2 Final     eGFR if non    Date Value Ref Range Status   03/11/2022 58 (A) >60 mL/min/1.73 m^2 Final     Comment:     Calculation used to obtain the estimated glomerular filtration  rate (eGFR) is the CKD-EPI equation.        Reviewed prior medical records including radiology report of 2/23/2024 CT abdomen pelvis; & endoscopy history (see surgical history/procedures).    Objective:      Physical Exam  Vitals and nursing note reviewed.   Constitutional:       General: She is not in acute distress.     Appearance: Normal appearance. She is well-developed. She is not diaphoretic.   HENT:      Mouth/Throat:      Lips: Pink. No lesions.      Mouth: Mucous membranes are moist. No oral lesions.      Tongue: No lesions.      Pharynx: Oropharynx is clear. No pharyngeal swelling or posterior oropharyngeal erythema.   Eyes:      General: No scleral icterus.     Conjunctiva/sclera: Conjunctivae normal.   Pulmonary:      Effort: Pulmonary effort is normal. No respiratory distress.      Breath sounds: Normal breath sounds. No wheezing.   Abdominal:      General: Bowel sounds are normal. There is no distension or abdominal bruit.      Palpations: Abdomen is soft. Abdomen is not rigid. There is no mass.      Tenderness: There is no abdominal tenderness. There is no guarding or rebound. Negative signs include  Blunt's sign and McBurney's sign.   Skin:     General: Skin is warm and dry.      Coloration: Skin is not jaundiced or pale.      Findings: No erythema or rash.   Neurological:      Mental Status: She is alert and oriented to person, place, and time.   Psychiatric:         Behavior: Behavior normal.         Thought Content: Thought content normal.         Judgment: Judgment normal.         Assessment:       1. Hospital discharge follow-up    2. History of ischemic colitis    3. Atherosclerosis    4. Elevated LFTs    5. Hepatic steatosis        Plan:       Hospital discharge follow-up & History of ischemic colitis  - symptoms have resolved  - consider adding baby aspirin 81 mg once daily; patient verbalized understanding  - Recommend follow-up with Primary Care Provider for continued evaluation and management.    Atherosclerosis  - Recommend follow-up with Primary Care Provider/cardiology for continued evaluation and management.  - consider adding baby aspirin 81 mg once daily; patient verbalized understanding    Elevated LFTs & Hepatic steatosis  -     Hepatic Function Panel; Future; Expected date: 03/06/2024  -     Hepatitis Panel, Acute; Future; Expected date: 03/06/2024  - For fatty liver recommend: low fat, low cholesterol diet, maintain good control of blood sugars and cholesterol levels, exercise, weight loss (if overweight), minimize/avoid alcohol and tylenol products, & follow-up with PCP for continued evaluation and management; if specialist is needed, recommend seeing hepatology.    Follow up in about 1 month (around 4/6/2024), or if symptoms worsen or fail to improve.      If no improvement in symptoms or symptoms worsen, call/follow-up at clinic or go to ER.        24 minutes of total time spent on the encounter, which includes face to face time and non-face to face time preparing to see the patient (e.g., review of tests), Obtaining and/or reviewing separately obtained history, Documenting clinical  information in the electronic or other health record, Independently interpreting results (not separately reported) and communicating results to the patient/family/caregiver, or Care coordination (not separately reported).

## 2024-05-27 PROBLEM — K62.5 RECTAL BLEED: Status: RESOLVED | Noted: 2024-02-23 | Resolved: 2024-05-27

## 2024-05-27 PROBLEM — N17.9 AKI (ACUTE KIDNEY INJURY): Status: RESOLVED | Noted: 2024-02-23 | Resolved: 2024-05-27
